# Patient Record
Sex: FEMALE | Race: OTHER | NOT HISPANIC OR LATINO | URBAN - METROPOLITAN AREA
[De-identification: names, ages, dates, MRNs, and addresses within clinical notes are randomized per-mention and may not be internally consistent; named-entity substitution may affect disease eponyms.]

---

## 2017-12-30 ENCOUNTER — INPATIENT (INPATIENT)
Facility: HOSPITAL | Age: 52
LOS: 5 days | Discharge: HOME | DRG: 101 | End: 2018-01-05
Attending: HOSPITALIST | Admitting: HOSPITALIST
Payer: COMMERCIAL

## 2017-12-30 VITALS
OXYGEN SATURATION: 98 % | SYSTOLIC BLOOD PRESSURE: 102 MMHG | HEIGHT: 62 IN | TEMPERATURE: 99 F | WEIGHT: 145.06 LBS | RESPIRATION RATE: 17 BRPM | DIASTOLIC BLOOD PRESSURE: 87 MMHG | HEART RATE: 90 BPM

## 2017-12-30 DIAGNOSIS — R56.9 UNSPECIFIED CONVULSIONS: ICD-10-CM

## 2017-12-30 DIAGNOSIS — N17.9 ACUTE KIDNEY FAILURE, UNSPECIFIED: ICD-10-CM

## 2017-12-30 DIAGNOSIS — E03.9 HYPOTHYROIDISM, UNSPECIFIED: ICD-10-CM

## 2017-12-30 DIAGNOSIS — E11.8 TYPE 2 DIABETES MELLITUS WITH UNSPECIFIED COMPLICATIONS: ICD-10-CM

## 2017-12-30 DIAGNOSIS — Z29.9 ENCOUNTER FOR PROPHYLACTIC MEASURES, UNSPECIFIED: ICD-10-CM

## 2017-12-30 LAB
ALBUMIN SERPL ELPH-MCNC: 3.4 G/DL — SIGNIFICANT CHANGE UP (ref 3.3–5)
ALP SERPL-CCNC: 28 U/L — LOW (ref 30–120)
ALT FLD-CCNC: 14 U/L DA — SIGNIFICANT CHANGE UP (ref 10–60)
ANION GAP SERPL CALC-SCNC: 15 MMOL/L — SIGNIFICANT CHANGE UP (ref 5–17)
APPEARANCE UR: CLEAR — SIGNIFICANT CHANGE UP
APTT BLD: 38.2 SEC — HIGH (ref 27.5–37.4)
AST SERPL-CCNC: 23 U/L — SIGNIFICANT CHANGE UP (ref 10–40)
BASOPHILS # BLD AUTO: 0.1 K/UL — SIGNIFICANT CHANGE UP (ref 0–0.2)
BASOPHILS NFR BLD AUTO: 2 % — SIGNIFICANT CHANGE UP (ref 0–2)
BILIRUB SERPL-MCNC: 0.5 MG/DL — SIGNIFICANT CHANGE UP (ref 0.2–1.2)
BILIRUB UR-MCNC: NEGATIVE — SIGNIFICANT CHANGE UP
BUN SERPL-MCNC: 21 MG/DL — SIGNIFICANT CHANGE UP (ref 7–23)
CALCIUM SERPL-MCNC: 8.9 MG/DL — SIGNIFICANT CHANGE UP (ref 8.4–10.5)
CHLORIDE SERPL-SCNC: 100 MMOL/L — SIGNIFICANT CHANGE UP (ref 96–108)
CK MB CFR SERPL CALC: 0.4 NG/ML — SIGNIFICANT CHANGE UP (ref 0–3.6)
CO2 SERPL-SCNC: 20 MMOL/L — LOW (ref 22–31)
COLOR SPEC: YELLOW — SIGNIFICANT CHANGE UP
CREAT SERPL-MCNC: 1.34 MG/DL — HIGH (ref 0.5–1.3)
DIFF PNL FLD: ABNORMAL
EOSINOPHIL # BLD AUTO: 0.4 K/UL — SIGNIFICANT CHANGE UP (ref 0–0.5)
EOSINOPHIL NFR BLD AUTO: 9.2 % — HIGH (ref 0–6)
GLUCOSE BLDC GLUCOMTR-MCNC: 73 MG/DL — SIGNIFICANT CHANGE UP (ref 70–99)
GLUCOSE BLDC GLUCOMTR-MCNC: 88 MG/DL — SIGNIFICANT CHANGE UP (ref 70–99)
GLUCOSE BLDC GLUCOMTR-MCNC: 88 MG/DL — SIGNIFICANT CHANGE UP (ref 70–99)
GLUCOSE SERPL-MCNC: 95 MG/DL — SIGNIFICANT CHANGE UP (ref 70–99)
GLUCOSE UR QL: NEGATIVE MG/DL — SIGNIFICANT CHANGE UP
HCT VFR BLD CALC: 43.7 % — SIGNIFICANT CHANGE UP (ref 34.5–45)
HGB BLD-MCNC: 14.9 G/DL — SIGNIFICANT CHANGE UP (ref 11.5–15.5)
INR BLD: 1.16 RATIO — SIGNIFICANT CHANGE UP (ref 0.88–1.16)
KETONES UR-MCNC: ABNORMAL
LEUKOCYTE ESTERASE UR-ACNC: ABNORMAL
LYMPHOCYTES # BLD AUTO: 2 K/UL — SIGNIFICANT CHANGE UP (ref 1–3.3)
LYMPHOCYTES # BLD AUTO: 44.5 % — HIGH (ref 13–44)
MCHC RBC-ENTMCNC: 29.3 PG — SIGNIFICANT CHANGE UP (ref 27–34)
MCHC RBC-ENTMCNC: 34 GM/DL — SIGNIFICANT CHANGE UP (ref 32–36)
MCV RBC AUTO: 86.2 FL — SIGNIFICANT CHANGE UP (ref 80–100)
MONOCYTES # BLD AUTO: 0.6 K/UL — SIGNIFICANT CHANGE UP (ref 0–0.9)
MONOCYTES NFR BLD AUTO: 14 % — SIGNIFICANT CHANGE UP (ref 2–14)
NEUTROPHILS # BLD AUTO: 1.4 K/UL — LOW (ref 1.8–7.4)
NEUTROPHILS NFR BLD AUTO: 30.3 % — LOW (ref 43–77)
NITRITE UR-MCNC: NEGATIVE — SIGNIFICANT CHANGE UP
PCP SPEC-MCNC: SIGNIFICANT CHANGE UP
PH UR: 5 — SIGNIFICANT CHANGE UP (ref 5–8)
PLATELET # BLD AUTO: 166 K/UL — SIGNIFICANT CHANGE UP (ref 150–400)
POTASSIUM SERPL-MCNC: 4.1 MMOL/L — SIGNIFICANT CHANGE UP (ref 3.5–5.3)
POTASSIUM SERPL-SCNC: 4.1 MMOL/L — SIGNIFICANT CHANGE UP (ref 3.5–5.3)
PROT SERPL-MCNC: 7.9 G/DL — SIGNIFICANT CHANGE UP (ref 6–8.3)
PROT UR-MCNC: 30 MG/DL
PROTHROM AB SERPL-ACNC: 12.7 SEC — SIGNIFICANT CHANGE UP (ref 9.8–12.7)
RBC # BLD: 5.07 M/UL — SIGNIFICANT CHANGE UP (ref 3.8–5.2)
RBC # FLD: 11.3 % — SIGNIFICANT CHANGE UP (ref 10.3–14.5)
SODIUM SERPL-SCNC: 135 MMOL/L — SIGNIFICANT CHANGE UP (ref 135–145)
SP GR SPEC: 1.02 — SIGNIFICANT CHANGE UP (ref 1.01–1.02)
TROPONIN I SERPL-MCNC: 0 NG/ML — LOW (ref 0.02–0.06)
UROBILINOGEN FLD QL: NEGATIVE MG/DL — SIGNIFICANT CHANGE UP
VALPROATE SERPL-MCNC: 61 UG/ML — SIGNIFICANT CHANGE UP (ref 50–100)
WBC # BLD: 4.5 K/UL — SIGNIFICANT CHANGE UP (ref 3.8–10.5)
WBC # FLD AUTO: 4.5 K/UL — SIGNIFICANT CHANGE UP (ref 3.8–10.5)

## 2017-12-30 PROCEDURE — 99285 EMERGENCY DEPT VISIT HI MDM: CPT

## 2017-12-30 PROCEDURE — 99222 1ST HOSP IP/OBS MODERATE 55: CPT

## 2017-12-30 PROCEDURE — 93010 ELECTROCARDIOGRAM REPORT: CPT

## 2017-12-30 PROCEDURE — 70450 CT HEAD/BRAIN W/O DYE: CPT | Mod: 26

## 2017-12-30 PROCEDURE — 71010: CPT | Mod: 26

## 2017-12-30 RX ORDER — SODIUM CHLORIDE 9 MG/ML
3 INJECTION INTRAMUSCULAR; INTRAVENOUS; SUBCUTANEOUS ONCE
Qty: 0 | Refills: 0 | Status: COMPLETED | OUTPATIENT
Start: 2017-12-30 | End: 2017-12-30

## 2017-12-30 RX ORDER — DEXTROSE 50 % IN WATER 50 %
25 SYRINGE (ML) INTRAVENOUS ONCE
Qty: 0 | Refills: 0 | Status: DISCONTINUED | OUTPATIENT
Start: 2017-12-30 | End: 2018-01-05

## 2017-12-30 RX ORDER — SODIUM CHLORIDE 9 MG/ML
1000 INJECTION, SOLUTION INTRAVENOUS
Qty: 0 | Refills: 0 | Status: DISCONTINUED | OUTPATIENT
Start: 2017-12-30 | End: 2018-01-01

## 2017-12-30 RX ORDER — GLUCAGON INJECTION, SOLUTION 0.5 MG/.1ML
1 INJECTION, SOLUTION SUBCUTANEOUS ONCE
Qty: 0 | Refills: 0 | Status: DISCONTINUED | OUTPATIENT
Start: 2017-12-30 | End: 2018-01-05

## 2017-12-30 RX ORDER — HEPARIN SODIUM 5000 [USP'U]/ML
5000 INJECTION INTRAVENOUS; SUBCUTANEOUS EVERY 8 HOURS
Qty: 0 | Refills: 0 | Status: DISCONTINUED | OUTPATIENT
Start: 2017-12-30 | End: 2018-01-05

## 2017-12-30 RX ORDER — DEXTROSE 50 % IN WATER 50 %
12.5 SYRINGE (ML) INTRAVENOUS ONCE
Qty: 0 | Refills: 0 | Status: DISCONTINUED | OUTPATIENT
Start: 2017-12-30 | End: 2018-01-05

## 2017-12-30 RX ORDER — SODIUM CHLORIDE 9 MG/ML
1000 INJECTION, SOLUTION INTRAVENOUS
Qty: 0 | Refills: 0 | Status: DISCONTINUED | OUTPATIENT
Start: 2017-12-30 | End: 2018-01-05

## 2017-12-30 RX ORDER — ENOXAPARIN SODIUM 100 MG/ML
40 INJECTION SUBCUTANEOUS EVERY 24 HOURS
Qty: 0 | Refills: 0 | Status: DISCONTINUED | OUTPATIENT
Start: 2017-12-30 | End: 2017-12-30

## 2017-12-30 RX ORDER — INSULIN LISPRO 100/ML
VIAL (ML) SUBCUTANEOUS
Qty: 0 | Refills: 0 | Status: DISCONTINUED | OUTPATIENT
Start: 2017-12-30 | End: 2018-01-05

## 2017-12-30 RX ORDER — DEXTROSE 50 % IN WATER 50 %
1 SYRINGE (ML) INTRAVENOUS ONCE
Qty: 0 | Refills: 0 | Status: DISCONTINUED | OUTPATIENT
Start: 2017-12-30 | End: 2018-01-05

## 2017-12-30 RX ORDER — INSULIN LISPRO 100/ML
VIAL (ML) SUBCUTANEOUS AT BEDTIME
Qty: 0 | Refills: 0 | Status: DISCONTINUED | OUTPATIENT
Start: 2017-12-30 | End: 2018-01-05

## 2017-12-30 RX ORDER — DIVALPROEX SODIUM 500 MG/1
0 TABLET, DELAYED RELEASE ORAL
Qty: 0 | Refills: 0 | COMMUNITY

## 2017-12-30 RX ADMIN — SODIUM CHLORIDE 100 MILLILITER(S): 9 INJECTION, SOLUTION INTRAVENOUS at 18:27

## 2017-12-30 RX ADMIN — SODIUM CHLORIDE 3 MILLILITER(S): 9 INJECTION INTRAMUSCULAR; INTRAVENOUS; SUBCUTANEOUS at 18:10

## 2017-12-30 NOTE — ED PROVIDER NOTE - OBJECTIVE STATEMENT
53 y/o F presents to the ED BIB EMS today. Pt apparently had a seizure in her hotel room. Hx of NIDDM. Finger stick 88, after glucagon. Takes Depakote. Patient states that she hit her head in her hotel room and is c/o HA but appears in post-ictal state.

## 2017-12-30 NOTE — H&P ADULT - NSHPLABSRESULTS_GEN_ALL_CORE
LABS:                        14.9   4.5   )-----------( 166      ( 30 Dec 2017 18:22 )             43.7     135    |  100    |  21     ----------------------------<  95       30 Dec 2017 18:22  4.1     |  20<L>  |  1.34<H>    Ca 8.9           30 Dec 2017 18:22    TPro  7.9    /  Alb  3.4    /  TBili  0.5    /  DBili  x      /  AST  23     /  ALT  14     /  AlkPhos  28<L>  30 Dec 2017 18:22    PT/INR - ( 30 Dec 2017 18:22 )   PT: 12.7 ;   INR: 1.16        PTT - ( 30 Dec 2017 18:22 )  PTT:38.2<H>    Urinalysis Basic - ( 30 Dec 2017 23:07 )    Color: Yellow / Appearance: Clear / S.025 / pH: x  Gluc: x / Ketone: Large  / Bili: Negative / Urobili: Negative mg/dL   Blood: x / Protein: 30 mg/dL / Nitrite: Negative   Leuk Esterase: Trace / RBC: 0-2 /HPF / WBC 0-2   Sq Epi: x / Non Sq Epi: Moderate / Bacteria: Moderate    Troponin trend:  .000   @ 18:22    EKG:  Radiology:  < from: CT Head No Cont (17 @ 20:37) >    FINDINGS:   There is no CT evidence of acute intracranial hemorrhage, extra-axial   collection, vasogenic edema, mass effect, midline shift, central   herniation, hydrocephalus or acute territorial infarct.     There is mild cerebral volume loss.  There is mild patchy white matter   hypoattenuation which is nonspecific in etiology.  There is severe   cerebellar volume loss.    The paranasal sinuses are grossly clear..    The mastoid air cells and middle ear cavities are grossly clear.    The calvarium, skull base, visualized facial bones and regional soft   tissues are grossly unremarkable.    IMPRESSION:   No CT evidence of acute intracranial hemorrhage, acute territorial   infarct or intracranial trauma.  Severe cerebellar volume loss.    Follow-up MRI can be obtained as clinically warranted.    < end of copied text > LABS:                        14.9   4.5   )-----------( 166      ( 30 Dec 2017 18:22 )             43.7     135    |  100    |  21     ----------------------------<  95       30 Dec 2017 18:22  4.1     |  20<L>  |  1.34<H>    Ca 8.9           30 Dec 2017 18:22    TPro  7.9    /  Alb  3.4    /  TBili  0.5    /  DBili  x      /  AST  23     /  ALT  14     /  AlkPhos  28<L>  30 Dec 2017 18:22    PT/INR - ( 30 Dec 2017 18:22 )   PT: 12.7 ;   INR: 1.16        PTT - ( 30 Dec 2017 18:22 )  PTT:38.2<H>    Urinalysis Basic - ( 30 Dec 2017 23:07 )    Color: Yellow / Appearance: Clear / S.025 / pH: x  Gluc: x / Ketone: Large  / Bili: Negative / Urobili: Negative mg/dL   Blood: x / Protein: 30 mg/dL / Nitrite: Negative   Leuk Esterase: Trace / RBC: 0-2 /HPF / WBC 0-2   Sq Epi: x / Non Sq Epi: Moderate / Bacteria: Moderate    Troponin trend:  .000  - @ 18:22    EKG:  NSR with 1st degree AV block, LAFB, q waves in aVR and aVL  Radiology:  < from: CT Head No Cont (17 @ 20:37) >    FINDINGS:   There is no CT evidence of acute intracranial hemorrhage, extra-axial   collection, vasogenic edema, mass effect, midline shift, central   herniation, hydrocephalus or acute territorial infarct.     There is mild cerebral volume loss.  There is mild patchy white matter   hypoattenuation which is nonspecific in etiology.  There is severe   cerebellar volume loss.    The paranasal sinuses are grossly clear..    The mastoid air cells and middle ear cavities are grossly clear.    The calvarium, skull base, visualized facial bones and regional soft   tissues are grossly unremarkable.    IMPRESSION:   No CT evidence of acute intracranial hemorrhage, acute territorial   infarct or intracranial trauma.  Severe cerebellar volume loss.    Follow-up MRI can be obtained as clinically warranted.    < end of copied text >

## 2017-12-30 NOTE — ED PROVIDER NOTE - CARE PLAN
Principal Discharge DX:	Seizure  Secondary Diagnosis:	Chronic intractable headache, unspecified headache type

## 2017-12-30 NOTE — H&P ADULT - PROBLEM SELECTOR PLAN 5
IMPROVE VTE Individual Risk Assessment          RISK                                                          Points  [  ] Previous VTE                                                 3  [  ] Thrombophilia                                              2  [  ] Lower limb paralysis                                    2        (unable to hold up >15 seconds)    [  ] Current Cancer                                             2         (within 6 months)  [  ] Immobilization > 24 hrs                              1  [  ] ICU/CCU stay > 24 hours                            1  [  ] Age > 60                                                        1    IMPROVE VTE Score 0    - will be on heparin 2/2 HIREN (no lovenox)

## 2017-12-30 NOTE — H&P ADULT - PROBLEM SELECTOR PLAN 4
- patient denies DM history despite ED reports  - will place on ISS with FS qAC and qHS  - check a1c

## 2017-12-30 NOTE — H&P ADULT - ASSESSMENT
52F with hx of hypothyroidism and seizure (rest of pmhx unknown, patient denies DM despite ER records) who presents with AMS and possible seizure episode.

## 2017-12-30 NOTE — ED PROVIDER NOTE - MEDICAL DECISION MAKING DETAILS
Evaluate the etiology of altered mental status and seizure activity corollate with history and lab imaging studies.

## 2017-12-30 NOTE — ED ADULT NURSE NOTE - PLAN OF CARE
Seizure precautions/Side rails/Fall precautions/Position of comfort/Call bell/Explanation of exam/test
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
## 2017-12-30 NOTE — H&P ADULT - NSHPOUTPATIENTPROVIDERS_GEN_ALL_CORE
neurologist at [place] (from report - patient does not know the name or location of neurologist)

## 2017-12-31 LAB
ALBUMIN SERPL ELPH-MCNC: 2.9 G/DL — LOW (ref 3.3–5)
ALP SERPL-CCNC: 27 U/L — LOW (ref 30–120)
ALT FLD-CCNC: 10 U/L DA — SIGNIFICANT CHANGE UP (ref 10–60)
ANION GAP SERPL CALC-SCNC: 8 MMOL/L — SIGNIFICANT CHANGE UP (ref 5–17)
AST SERPL-CCNC: 13 U/L — SIGNIFICANT CHANGE UP (ref 10–40)
BASOPHILS # BLD AUTO: 0.1 K/UL — SIGNIFICANT CHANGE UP (ref 0–0.2)
BILIRUB SERPL-MCNC: 0.4 MG/DL — SIGNIFICANT CHANGE UP (ref 0.2–1.2)
BUN SERPL-MCNC: 18 MG/DL — SIGNIFICANT CHANGE UP (ref 7–23)
CALCIUM SERPL-MCNC: 8.5 MG/DL — SIGNIFICANT CHANGE UP (ref 8.4–10.5)
CHLORIDE SERPL-SCNC: 102 MMOL/L — SIGNIFICANT CHANGE UP (ref 96–108)
CO2 SERPL-SCNC: 21 MMOL/L — LOW (ref 22–31)
CREAT SERPL-MCNC: 1.16 MG/DL — SIGNIFICANT CHANGE UP (ref 0.5–1.3)
EOSINOPHIL # BLD AUTO: 0.5 K/UL — SIGNIFICANT CHANGE UP (ref 0–0.5)
EOSINOPHIL NFR BLD AUTO: 12 % — HIGH (ref 0–6)
ETHANOL SERPL-MCNC: <3 MG/DL — SIGNIFICANT CHANGE UP (ref 0–3)
GLUCOSE SERPL-MCNC: 103 MG/DL — HIGH (ref 70–99)
HCT VFR BLD CALC: 39.6 % — SIGNIFICANT CHANGE UP (ref 34.5–45)
HGB BLD-MCNC: 13.3 G/DL — SIGNIFICANT CHANGE UP (ref 11.5–15.5)
LYMPHOCYTES # BLD AUTO: 1.8 K/UL — SIGNIFICANT CHANGE UP (ref 1–3.3)
LYMPHOCYTES # BLD AUTO: 37 % — SIGNIFICANT CHANGE UP (ref 13–44)
MAGNESIUM SERPL-MCNC: 1.7 MG/DL — SIGNIFICANT CHANGE UP (ref 1.6–2.6)
MCHC RBC-ENTMCNC: 29.1 PG — SIGNIFICANT CHANGE UP (ref 27–34)
MCHC RBC-ENTMCNC: 33.5 GM/DL — SIGNIFICANT CHANGE UP (ref 32–36)
MCV RBC AUTO: 86.9 FL — SIGNIFICANT CHANGE UP (ref 80–100)
MONOCYTES # BLD AUTO: 0.9 K/UL — SIGNIFICANT CHANGE UP (ref 0–0.9)
MONOCYTES NFR BLD AUTO: 13 % — SIGNIFICANT CHANGE UP (ref 2–14)
NEUTROPHILS # BLD AUTO: 1.8 K/UL — SIGNIFICANT CHANGE UP (ref 1.8–7.4)
NEUTROPHILS NFR BLD AUTO: 38 % — LOW (ref 43–77)
PHOSPHATE SERPL-MCNC: 3.8 MG/DL — SIGNIFICANT CHANGE UP (ref 2.5–4.5)
PLATELET # BLD AUTO: 146 K/UL — LOW (ref 150–400)
POTASSIUM SERPL-MCNC: 3.8 MMOL/L — SIGNIFICANT CHANGE UP (ref 3.5–5.3)
POTASSIUM SERPL-SCNC: 3.8 MMOL/L — SIGNIFICANT CHANGE UP (ref 3.5–5.3)
PROT SERPL-MCNC: 7 G/DL — SIGNIFICANT CHANGE UP (ref 6–8.3)
RBC # BLD: 4.55 M/UL — SIGNIFICANT CHANGE UP (ref 3.8–5.2)
RBC # FLD: 11.6 % — SIGNIFICANT CHANGE UP (ref 10.3–14.5)
SODIUM SERPL-SCNC: 131 MMOL/L — LOW (ref 135–145)
TSH SERPL-MCNC: 3.04 UIU/ML — SIGNIFICANT CHANGE UP (ref 0.27–4.2)
WBC # BLD: 5.1 K/UL — SIGNIFICANT CHANGE UP (ref 3.8–10.5)
WBC # FLD AUTO: 5.1 K/UL — SIGNIFICANT CHANGE UP (ref 3.8–10.5)

## 2017-12-31 PROCEDURE — 99233 SBSQ HOSP IP/OBS HIGH 50: CPT

## 2017-12-31 PROCEDURE — 93010 ELECTROCARDIOGRAM REPORT: CPT

## 2017-12-31 RX ORDER — DIVALPROEX SODIUM 500 MG/1
500 TABLET, DELAYED RELEASE ORAL
Qty: 0 | Refills: 0 | Status: DISCONTINUED | OUTPATIENT
Start: 2017-12-31 | End: 2018-01-05

## 2017-12-31 RX ADMIN — HEPARIN SODIUM 5000 UNIT(S): 5000 INJECTION INTRAVENOUS; SUBCUTANEOUS at 17:56

## 2017-12-31 RX ADMIN — DIVALPROEX SODIUM 500 MILLIGRAM(S): 500 TABLET, DELAYED RELEASE ORAL at 17:56

## 2017-12-31 RX ADMIN — HEPARIN SODIUM 5000 UNIT(S): 5000 INJECTION INTRAVENOUS; SUBCUTANEOUS at 23:47

## 2017-12-31 NOTE — CONSULT NOTE ADULT - ASSESSMENT
Seen for seizure. Says she has h/o seizure since age 14.  AMS. Likely has underlying poor cognition/MR?  Had hypoglycemia. BS was 49  Pt has h/o seizures and is on Depakote - dose unknown. Depakote level in ED was therapeutic at 61.  Limited but non focal exam.  CT Head - No acute pathology. Severe cerebellar volume loss.  Breakthrough seizures secondary to Hypoglycemia.  Would continue Depakote 500 mg PO BID.  D/w covering nurse.  Fall/seizure precautions at all times.  Would continue to follow.

## 2017-12-31 NOTE — PROGRESS NOTE ADULT - PROBLEM SELECTOR PLAN 1
- possibly post-ictal still   -  depakote level 61   - check utox negative.   - check TSH pending  - neuro consulted.

## 2018-01-01 LAB
ANION GAP SERPL CALC-SCNC: 8 MMOL/L — SIGNIFICANT CHANGE UP (ref 5–17)
BUN SERPL-MCNC: 10 MG/DL — SIGNIFICANT CHANGE UP (ref 7–23)
CALCIUM SERPL-MCNC: 8.1 MG/DL — LOW (ref 8.4–10.5)
CHLORIDE SERPL-SCNC: 104 MMOL/L — SIGNIFICANT CHANGE UP (ref 96–108)
CO2 SERPL-SCNC: 24 MMOL/L — SIGNIFICANT CHANGE UP (ref 22–31)
CREAT SERPL-MCNC: 1.23 MG/DL — SIGNIFICANT CHANGE UP (ref 0.5–1.3)
GLUCOSE SERPL-MCNC: 112 MG/DL — HIGH (ref 70–99)
POTASSIUM SERPL-MCNC: 4 MMOL/L — SIGNIFICANT CHANGE UP (ref 3.5–5.3)
POTASSIUM SERPL-SCNC: 4 MMOL/L — SIGNIFICANT CHANGE UP (ref 3.5–5.3)
SODIUM SERPL-SCNC: 136 MMOL/L — SIGNIFICANT CHANGE UP (ref 135–145)

## 2018-01-01 PROCEDURE — 99233 SBSQ HOSP IP/OBS HIGH 50: CPT

## 2018-01-01 RX ORDER — SODIUM CHLORIDE 9 MG/ML
1000 INJECTION INTRAMUSCULAR; INTRAVENOUS; SUBCUTANEOUS
Qty: 0 | Refills: 0 | Status: DISCONTINUED | OUTPATIENT
Start: 2018-01-01 | End: 2018-01-02

## 2018-01-01 RX ADMIN — DIVALPROEX SODIUM 500 MILLIGRAM(S): 500 TABLET, DELAYED RELEASE ORAL at 06:43

## 2018-01-01 RX ADMIN — DIVALPROEX SODIUM 500 MILLIGRAM(S): 500 TABLET, DELAYED RELEASE ORAL at 17:43

## 2018-01-01 RX ADMIN — HEPARIN SODIUM 5000 UNIT(S): 5000 INJECTION INTRAVENOUS; SUBCUTANEOUS at 08:39

## 2018-01-01 RX ADMIN — HEPARIN SODIUM 5000 UNIT(S): 5000 INJECTION INTRAVENOUS; SUBCUTANEOUS at 22:25

## 2018-01-01 NOTE — PROGRESS NOTE ADULT - PROBLEM SELECTOR PLAN 1
- possibly post-ictal still   -  depakote level 61   - check utox negative.   - check TSH normal  - neuro consulted.  may 2/2 hypoglycemia, d/c d5 ivf, monitor blood glucose.

## 2018-01-02 LAB
ANION GAP SERPL CALC-SCNC: 9 MMOL/L — SIGNIFICANT CHANGE UP (ref 5–17)
BUN SERPL-MCNC: 6 MG/DL — LOW (ref 7–23)
CALCIUM SERPL-MCNC: 8.1 MG/DL — LOW (ref 8.4–10.5)
CHLORIDE SERPL-SCNC: 108 MMOL/L — SIGNIFICANT CHANGE UP (ref 96–108)
CO2 SERPL-SCNC: 24 MMOL/L — SIGNIFICANT CHANGE UP (ref 22–31)
CREAT SERPL-MCNC: 1.11 MG/DL — SIGNIFICANT CHANGE UP (ref 0.5–1.3)
GLUCOSE SERPL-MCNC: 73 MG/DL — SIGNIFICANT CHANGE UP (ref 70–99)
POTASSIUM SERPL-MCNC: 4.1 MMOL/L — SIGNIFICANT CHANGE UP (ref 3.5–5.3)
POTASSIUM SERPL-SCNC: 4.1 MMOL/L — SIGNIFICANT CHANGE UP (ref 3.5–5.3)
SODIUM SERPL-SCNC: 141 MMOL/L — SIGNIFICANT CHANGE UP (ref 135–145)

## 2018-01-02 PROCEDURE — 99233 SBSQ HOSP IP/OBS HIGH 50: CPT

## 2018-01-02 RX ADMIN — DIVALPROEX SODIUM 500 MILLIGRAM(S): 500 TABLET, DELAYED RELEASE ORAL at 18:01

## 2018-01-02 RX ADMIN — HEPARIN SODIUM 5000 UNIT(S): 5000 INJECTION INTRAVENOUS; SUBCUTANEOUS at 17:59

## 2018-01-02 RX ADMIN — DIVALPROEX SODIUM 500 MILLIGRAM(S): 500 TABLET, DELAYED RELEASE ORAL at 07:03

## 2018-01-02 NOTE — PROGRESS NOTE ADULT - PROBLEM SELECTOR PLAN 1
- possibly post-ictal still   -  depakote level 61   - check utox negative.   - check TSH normal  - neuro consulted.  may 2/2 hypoglycemia,  still hypoglycemic on diabetic diet, so switch her to regular diet and monitor BS over night, diabetic nurse consulted, HbA1 c pending.

## 2018-01-02 NOTE — ADVANCED PRACTICE NURSE CONSULT - ASSESSMENT
CAPILLARY BLOOD GLUCOSE      POCT Blood Glucose.: 69 mg/dL (02 Jan 2018 07:37)  POCT Blood Glucose.: 88 mg/dL (01 Jan 2018 22:20)  POCT Blood Glucose.: 71 mg/dL (01 Jan 2018 19:24)  POCT Blood Glucose.: >600 mg/dL (01 Jan 2018 18:56)  POCT Blood Glucose.: 63 mg/dL (01 Jan 2018 17:25)  POCT Blood Glucose.: 83 mg/dL (01 Jan 2018 11:51)      Vital Signs Last 24 Hrs  T(C): 36.8 (02 Jan 2018 07:53), Max: 36.8 (01 Jan 2018 11:30)  T(F): 98.3 (02 Jan 2018 07:53), Max: 98.3 (02 Jan 2018 07:53)  HR: 64 (02 Jan 2018 07:53) (61 - 78)  BP: 98/63 (02 Jan 2018 07:53) (98/63 - 690/60)  BP(mean): --  RR: 15 (02 Jan 2018 07:53) (14 - 16)  SpO2: 100% (02 Jan 2018 07:53) (98% - 100%)    General: WN/WD NAD  Respiratory: CTA B/L  CV: RRR, S1S2, no murmurs, rubs or gallops  Abdominal: Soft, NT, ND +BS,   Extremities: No edema, + peripheral pulses     01-02    141  |  108  |  6<L>  ----------------------------<  73  4.1   |  24  |  1.11    Ca    8.1<L>      02 Jan 2018 07:21        dextrose 50% Injectable 12.5 Gram(s) IV Push once  dextrose 50% Injectable 25 Gram(s) IV Push once  dextrose 50% Injectable 25 Gram(s) IV Push once  dextrose Gel 1 Dose(s) Oral once PRN  glucagon  Injectable 1 milliGRAM(s) IntraMuscular once PRN  insulin lispro (HumaLOG) corrective regimen sliding scale   SubCutaneous three times a day before meals  insulin lispro (HumaLOG) corrective regimen sliding scale   SubCutaneous at bedtime  Assessment/Plan  Hypoglycemia: denies history of diabetes non receptive to intervention, appetite excellent  A1c pending  Plan  Monitor glucose trends  Assess A1c  Regular diet

## 2018-01-02 NOTE — ADVANCED PRACTICE NURSE CONSULT - RECOMMEDATIONS
follow up post discharge to evaluate history of pre diabetes/diabetes  follow up with patient after A1c results

## 2018-01-03 DIAGNOSIS — E16.2 HYPOGLYCEMIA, UNSPECIFIED: ICD-10-CM

## 2018-01-03 LAB
ANION GAP SERPL CALC-SCNC: 8 MMOL/L — SIGNIFICANT CHANGE UP (ref 5–17)
BUN SERPL-MCNC: 7 MG/DL — SIGNIFICANT CHANGE UP (ref 7–23)
CALCIUM SERPL-MCNC: 8.5 MG/DL — SIGNIFICANT CHANGE UP (ref 8.4–10.5)
CHLORIDE SERPL-SCNC: 107 MMOL/L — SIGNIFICANT CHANGE UP (ref 96–108)
CO2 SERPL-SCNC: 21 MMOL/L — LOW (ref 22–31)
CREAT SERPL-MCNC: 1.06 MG/DL — SIGNIFICANT CHANGE UP (ref 0.5–1.3)
GLUCOSE SERPL-MCNC: 74 MG/DL — SIGNIFICANT CHANGE UP (ref 70–99)
POTASSIUM SERPL-MCNC: 4.9 MMOL/L — SIGNIFICANT CHANGE UP (ref 3.5–5.3)
POTASSIUM SERPL-SCNC: 4.9 MMOL/L — SIGNIFICANT CHANGE UP (ref 3.5–5.3)
SODIUM SERPL-SCNC: 136 MMOL/L — SIGNIFICANT CHANGE UP (ref 135–145)

## 2018-01-03 PROCEDURE — 99233 SBSQ HOSP IP/OBS HIGH 50: CPT

## 2018-01-03 RX ADMIN — DIVALPROEX SODIUM 500 MILLIGRAM(S): 500 TABLET, DELAYED RELEASE ORAL at 18:21

## 2018-01-03 RX ADMIN — DIVALPROEX SODIUM 500 MILLIGRAM(S): 500 TABLET, DELAYED RELEASE ORAL at 06:01

## 2018-01-03 NOTE — PROGRESS NOTE ADULT - PROBLEM SELECTOR PLAN 1
- possibly post-ictal still   -  depakote level 61   - check utox negative.   - check TSH normal  - neuro consulted.  most likely 2/2 hypoglycemia,  still hypoglycemic on regular  diet,, diabetic nurse consulted, HbA1 c normal 5.3   started hypoglycemic work up, ordered serum insulin level, proinsulin, C-peptide. insulin Ab at am when fasting and hypoglycemic.

## 2018-01-04 LAB
B-OH-BUTYR SERPL-SCNC: 0.1 MMOL/L — SIGNIFICANT CHANGE UP
C PEPTIDE SERPL-MCNC: 0.8 NG/ML — LOW (ref 0.9–7.1)
INSULIN SERPL-MCNC: 4 UU/ML — SIGNIFICANT CHANGE UP (ref 3–17)

## 2018-01-04 PROCEDURE — 99233 SBSQ HOSP IP/OBS HIGH 50: CPT

## 2018-01-04 RX ADMIN — DIVALPROEX SODIUM 500 MILLIGRAM(S): 500 TABLET, DELAYED RELEASE ORAL at 06:39

## 2018-01-04 RX ADMIN — DIVALPROEX SODIUM 500 MILLIGRAM(S): 500 TABLET, DELAYED RELEASE ORAL at 18:07

## 2018-01-04 NOTE — PROGRESS NOTE ADULT - PROBLEM SELECTOR PLAN 3
-  TSH normal
-  TSH normal
-  TSH normal  - can try to verify dosage if and when patient is more coherent
-  TSH normal  - can try to verify dosage if and when patient is more coherent
- check TSH  - can try to verify dosage if and when patient is more coherent

## 2018-01-04 NOTE — PROGRESS NOTE ADULT - PROBLEM SELECTOR PLAN 4
DM rule out , her Hb a1 c in normal range,   hypoglycemia, as above. consulted Dr Perlman.
- patient denies DM history despite ED reports  - will place on ISS with FS qAC and qHS  - check a1c
DM rule out , her Hb a1 c in normal range,   hypoglycemia, as above. consulted Dr Perlman.
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
## 2018-01-04 NOTE — PROGRESS NOTE ADULT - NSHPATTENDINGPLANDISCUSS_GEN_ALL_CORE
patient and covering nurse.
patient.
patient. Questions answered.
Pt  and DR nowak, about her low BS.
pt and Dr Preciado  about her seizure dx.
pt and Dr Preciado about her seizure.
pt and Dr gonzalez about seizure .
pt and Dr perlman about hypoglycemia.

## 2018-01-04 NOTE — CONSULT NOTE ADULT - PROBLEM SELECTOR RECOMMENDATION 9
to check c peptide, insulin, proinsulin, serum blood glucose if bg less than 70mg/sl  ?hypoglycemic seizures; cont to monitor bg numbers while of dextrose infusion
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
## 2018-01-04 NOTE — PROGRESS NOTE ADULT - PROBLEM SELECTOR PROBLEM 4
Type 2 diabetes mellitus with complication, unspecified long term insulin use status

## 2018-01-04 NOTE — PROGRESS NOTE ADULT - PROBLEM SELECTOR PROBLEM 2
HIREN (acute kidney injury)

## 2018-01-04 NOTE — PROGRESS NOTE ADULT - PROBLEM SELECTOR PLAN 1
- possibly post-ictal still   -  depakote level 61   - check utox negative.   - check TSH normal  - neuro consulted.  most likely 2/2 hypoglycemia,  still hypoglycemic on regular  diet,, diabetic nurse consulted, HbA1 c normal 5.3   started hypoglycemic work up, ordered serum insulin level, proinsulin, C-peptide. insulin Ab at am when fasting and hypoglycemic.  pt was npo last night and this am was not hypoglycemic, stable, all labs w/o was sent. f/u with Dr perlman endo. who was consulted.

## 2018-01-05 VITALS
RESPIRATION RATE: 16 BRPM | SYSTOLIC BLOOD PRESSURE: 108 MMHG | HEART RATE: 74 BPM | OXYGEN SATURATION: 98 % | DIASTOLIC BLOOD PRESSURE: 64 MMHG | TEMPERATURE: 98 F

## 2018-01-05 PROCEDURE — 99239 HOSP IP/OBS DSCHRG MGMT >30: CPT

## 2018-01-05 RX ADMIN — DIVALPROEX SODIUM 500 MILLIGRAM(S): 500 TABLET, DELAYED RELEASE ORAL at 06:31

## 2018-01-05 NOTE — DISCHARGE NOTE ADULT - CARE PROVIDER_API CALL
neurologist,   Phone: (   )    -  Fax: (   )    -    [facility],   Phone: (   )    -  Fax: (   )    - neurologist,   Phone: (   )    -  Fax: (   )    -    [facility],   Phone: (   )    -  Fax: (   )    -    Perlman, Craig D (), [place] [address]  Phone: [phone]  Fax: [phone]

## 2018-01-05 NOTE — PROGRESS NOTE ADULT - ASSESSMENT
52F with hx of hypothyroidism and seizure (rest of pmhx unknown, patient denies DM despite ER records) who presents with AMS and possible seizure episode.
Hypoglycemia: no history of diabetes
Hypopglycemia  educated on use of home glucose monitor   educated on s/s hypoglycemia and antedote
Seen for seizure. Says she has h/o seizure since age 14.  AMS. Likely has underlying poor cognition/MR?  Had hypoglycemia. BS was 49  Pt has h/o seizures and is on Depakote - dose unknown. Depakote level in ED was therapeutic at 61.  CT Head - No acute pathology. Severe cerebellar volume loss.  Breakthrough seizures secondary to Hypoglycemia.  Continue Depakote 500 mg PO BID.  D/w covering nurse.  Fall/seizure precautions at all times.  DC planning.
Seen for seizure. Says she has h/o seizure since age 14.  AMS. Likely has underlying poor cognition/MR?  Had hypoglycemia. BS was 49  Pt has h/o seizures and is on Depakote.   Depakote level in ED was therapeutic at 61.   Continue Depakote 500 mg PO BID.  CT Head - No acute pathology. Severe cerebellar volume loss.  Breakthrough seizures secondary to Hypoglycemia.  Pt is not diabetic. HbA1c is 5.0  D/w covering nurse.  Fall/seizure precautions at all times.  DC planning.
Seen for seizure. Says she has h/o seizure since age 14.  AMS. Likely has underlying poor cognition/MR?  Had hypoglycemia. BS was 49  Pt is NOT diabetic. HbA1c - 5.0  Pt has h/o seizures and is on Depakote - dose unknown. Depakote level in ED was therapeutic at 61.  CT Head - No acute pathology. Severe cerebellar volume loss.  Breakthrough seizures secondary to Hypoglycemia.  Continue Depakote 500 mg PO BID.  D/w covering nurse.  Fall/seizure precautions at all times.  DC planning.

## 2018-01-05 NOTE — DISCHARGE NOTE ADULT - PATIENT PORTAL LINK FT
“You can access the [facility] Patient Portal, offered by [facility], by registering with the following website: [email or web address]”

## 2018-01-05 NOTE — DISCHARGE NOTE ADULT - CARE PLAN
Principal Discharge DX:	Seizure  Goal:	2/2 hypoglycemia, controlled, cont depakote and f/u with your neurologist.  Instructions for follow-up, activity and diet:	as above  Secondary Diagnosis:	Hypoglycemia  Goal:	take snack at bed time and eat enugh with good portion of food to avoid hypoglycemia. Principal Discharge DX:	Seizure  Goal:	2/2 hypoglycemia, controlled, cont depakote and f/u with your neurologist.  Instructions for follow-up, activity and diet:	as above  Secondary Diagnosis:	Hypoglycemia  Goal:	take snack at bed time and eat enough with good portion of food to avoid hypoglycemia.  Instructions for follow-up, activity and diet:	pt is not diabetic , HbA1c normal,  and not hypoglycemic any more no need for monitoring accucheck   if she is symptomatic , recommend f/u with DR Perlman

## 2018-01-05 NOTE — DISCHARGE NOTE ADULT - PLAN OF CARE
2/2 hypoglycemia, controlled, cont depakote and f/u with your neurologist. as above take snack at bed time and eat enugh with good portion of food to avoid hypoglycemia. take snack at bed time and eat enough with good portion of food to avoid hypoglycemia. pt is not diabetic , HbA1c normal,  and not hypoglycemic any more no need for monitoring accucheck   if she is symptomatic , recommend f/u with DR Perlman

## 2018-01-05 NOTE — DISCHARGE NOTE ADULT - HOSPITAL COURSE
52F with hx of hypothyroidism and seizure (rest of pmhx unknown, patient denies DM despite ER records) who presents with AMS and possible seizure episode.  Per report, patient was at a motel and she claimed she hit her head on glass (unclear of etiology) and then suffered a seizure.  Patient says she was in her usual state of health prior to event.  However, patient unable to express any other complaints.  Patient is minimally verbal.  Patient reports having family in the [facility] but none in [place].  Would not release any information regarding her family members.  Reportedly patient was found to have a FS of 49 in the field and upon admission was found to have a FS of 88.  Patient's HCT was negative for any acute findings except for severe cerebellar volume loss.  Rest of the labs were showed slight HIREN at 1.34 and a troponin of 0.           Problem/Plan - 1:  ·  Problem: Seizure.  Plan:    -  depakote level 61   - check utox negative.   - check TSH normal  - neuro consulted.  most likely 2/2 hypoglycemia,  still hypoglycemic on regular  diet,, diabetic nurse consulted, HbA1 c normal 5.3   started hypoglycemic work up, ordered serum insulin level, proinsulin, C-peptide. insulin Ab at am when fasting and hypoglycemic.  pt was npo last night and this am was not hypoglycemic, stable, all labs w/o was sent. f/u with Dr perlman endo. who was consulted. Insulin and c-peptide level back not supporting insulinoma diagnosis. f/u with Dr perlman for other work up and f/u on the rest of these  test.      Problem/Plan - 2:  ·  Problem: HIREN (acute kidney injury).  Plan: resolved.      Problem/Plan - 3:  ·  Problem: Hypothyroidism, unspecified type.  Plan: -  TSH normal.      Problem/Plan - 4:  ·  Problem: Type 2 diabetes mellitus with complication, unspecified long term insulin use status.  Plan: DM rule out , her Hb a1 c in normal range,   hypoglycemia, as above. consulted Dr Perlman. f/ u with him.       pt eats in motel and has no ride , tried to get her home care but pt refused, will discharge her tomorrow after storm so she can have something to eat in motel other wise this pt with low appetite will have another episode of hypoglycemia and would have seizure again.   1/5/18:  pt feels better, not hypoglycemic any more, blood sugar in normal range, stable for discharge. C-peptide  low ,  Beta hydroxy buteride and  insulin normal.     I saw and examined her today  I spent 40 min , no pcp to notify   PHYSICAL EXAM    Constitutional: NAD, well-groomed, well-developed  HEENT: PERRLA, EOMI, Normal Hearing, MMM  Neck: No LAD, No JVD  Back: Normal spine flexure, No CVA tenderness  Respiratory: CTAB/L   Cardiovascular: S1 and S2, RRR, no M/G/R  Gastrointestinal: BS+, soft, NT/ND  Extremities: No peripheral edema  Vascular: 2+ peripheral pulses  Neurological: A/O x 3, no focal deficits  Skin: No rashes

## 2018-01-05 NOTE — DISCHARGE NOTE ADULT - MEDICATION SUMMARY - MEDICATIONS TO TAKE
I will START or STAY ON the medications listed below when I get home from the hospital:    Depakote  -- Indication: For Seizure

## 2018-01-05 NOTE — PROGRESS NOTE ADULT - PROVIDER SPECIALTY LIST ADULT
Diabetes
Diabetes
Hospitalist
Neurology
Hospitalist

## 2018-01-05 NOTE — DISCHARGE NOTE ADULT - PROVIDER TOKENS
FREE:[LAST:[neurologist],PHONE:[(   )    -],FAX:[(   )    -]],FREE:[LAST:[pcp],PHONE:[(   )    -],FAX:[(   )    -]] FREE:[LAST:[neurologist],PHONE:[(   )    -],FAX:[(   )    -]],FREE:[LAST:[pcp],PHONE:[(   )    -],FAX:[(   )    -]],TOKEN:'[id number]'

## 2018-01-05 NOTE — PROGRESS NOTE ADULT - SUBJECTIVE AND OBJECTIVE BOX
Patient is a 52y old  Female who presents with a chief complaint of AMS, seizure (30 Dec 2017 22:55)      INTERVAL HPI/OVERNIGHT EVENTS: 52F with hx of hypothyroidism and seizure (rest of pmhx unknown, patient denies DM despite ER records) who presents with AMS and possible seizure episode. pt feels better, not hypoglycemia today. resumed diet. nursing staff notified pt is not eating much,     MEDICATIONS  (STANDING):  dextrose 5%. 1000 milliLiter(s) (50 mL/Hr) IV Continuous <Continuous>  dextrose 50% Injectable 12.5 Gram(s) IV Push once  dextrose 50% Injectable 25 Gram(s) IV Push once  dextrose 50% Injectable 25 Gram(s) IV Push once  diVALproex  milliGRAM(s) Oral two times a day  heparin  Injectable 5000 Unit(s) SubCutaneous every 8 hours  insulin lispro (HumaLOG) corrective regimen sliding scale   SubCutaneous three times a day before meals  insulin lispro (HumaLOG) corrective regimen sliding scale   SubCutaneous at bedtime    MEDICATIONS  (PRN):  dextrose Gel 1 Dose(s) Oral once PRN Blood Glucose LESS THAN 70 milliGRAM(s)/deciliter  glucagon  Injectable 1 milliGRAM(s) IntraMuscular once PRN Glucose LESS THAN 70 milligrams/deciliter      Allergies    phenobarbital (Unknown)    Intolerances        REVIEW OF SYSTEMS:  CONSTITUTIONAL: No fever, weight loss, or fatigue  EYES: No eye pain, visual disturbances, or discharge  ENMT:  No difficulty hearing, tinnitus, vertigo; No sinus or throat pain  NECK: No pain or stiffness  BREASTS: No pain, masses, or nipple discharge  RESPIRATORY: No cough, wheezing, chills or hemoptysis; No shortness of breath  CARDIOVASCULAR: No chest pain, palpitations, dizziness, or leg swelling  GASTROINTESTINAL: No abdominal or epigastric pain. No nausea, vomiting, or hematemesis; No diarrhea or constipation. No melena or hematochezia.  GENITOURINARY: No dysuria, frequency, hematuria, or incontinence  NEUROLOGICAL: No headaches, memory loss, loss of strength, numbness, or tremors  SKIN: No itching, burning, rashes, or lesions   LYMPH NODES: No enlarged glands  ENDOCRINE: No heat or cold intolerance; No hair loss; No polydipsia or polyuria  MUSCULOSKELETAL: No joint pain or swelling; No muscle, back, or extremity pain  PSYCHIATRIC: No depression, anxiety, mood swings, or difficulty sleeping  HEME/LYMPH: No easy bruising, or bleeding gums  ALLERGY AND IMMUNOLOGIC: No hives or eczema    Vital Signs Last 24 Hrs  T(C): 36.8 (04 Jan 2018 11:33), Max: 36.9 (03 Jan 2018 23:06)  T(F): 98.2 (04 Jan 2018 11:33), Max: 98.5 (03 Jan 2018 23:06)  HR: 88 (04 Jan 2018 11:33) (66 - 88)  BP: 113/88 (04 Jan 2018 11:33) (90/59 - 113/88)  BP(mean): --  RR: 18 (04 Jan 2018 11:33) (15 - 18)  SpO2: 91% (04 Jan 2018 11:33) (91% - 99%)    PHYSICAL EXAM:  GENERAL: NAD, well-groomed, well-developed  HEAD:  Atraumatic, Normocephalic  EYES: EOMI, PERRLA, conjunctiva and sclera clear  ENMT: No tonsillar erythema, exudates, or enlargement; Moist mucous membranes, Good dentition, No lesions  NECK: Supple, No JVD, Normal thyroid  NERVOUS SYSTEM:  Alert & Oriented X3, Good concentration; Motor Strength 5/5 B/L upper and lower extremities; DTRs 2+ intact and symmetric  CHEST/LUNG: Clear to auscultation bilaterally; No rales, rhonchi, wheezing, or rubs  HEART: Regular rate and rhythm; No murmurs, rubs, or gallops  ABDOMEN: Soft, Nontender, Nondistended; Bowel sounds present  EXTREMITIES:  2+ Peripheral Pulses, No clubbing, cyanosis, or edema  LYMPH: No lymphadenopathy noted  SKIN: No rashes or lesions    LABS:      Ca    8.5        03 Jan 2018 07:41          CAPILLARY BLOOD GLUCOSE      POCT Blood Glucose.: 88 mg/dL (04 Jan 2018 12:14)  POCT Blood Glucose.: 82 mg/dL (04 Jan 2018 07:34)  POCT Blood Glucose.: 90 mg/dL (04 Jan 2018 05:48)  POCT Blood Glucose.: 111 mg/dL (03 Jan 2018 21:57)  POCT Blood Glucose.: 107 mg/dL (03 Jan 2018 16:38)      RADIOLOGY & ADDITIONAL TESTS:    Imaging Personally Reviewed: CT head no acute finding [x ] YES  [ ] NO    Consultant(s) Notes Reviewed:  [x ] YES  [ ] NO    Care Discussed with Consultants/Other Providers [x ] YES  [ ] NO
Chief Complaint:     History:    MEDICATIONS  (STANDING):  dextrose 5%. 1000 milliLiter(s) (50 mL/Hr) IV Continuous <Continuous>  dextrose 50% Injectable 12.5 Gram(s) IV Push once  dextrose 50% Injectable 25 Gram(s) IV Push once  dextrose 50% Injectable 25 Gram(s) IV Push once  diVALproex  milliGRAM(s) Oral two times a day  heparin  Injectable 5000 Unit(s) SubCutaneous every 8 hours  insulin lispro (HumaLOG) corrective regimen sliding scale   SubCutaneous three times a day before meals  insulin lispro (HumaLOG) corrective regimen sliding scale   SubCutaneous at bedtime    MEDICATIONS  (PRN):  dextrose Gel 1 Dose(s) Oral once PRN Blood Glucose LESS THAN 70 milliGRAM(s)/deciliter  glucagon  Injectable 1 milliGRAM(s) IntraMuscular once PRN Glucose LESS THAN 70 milligrams/deciliter      Allergies    phenobarbital (Unknown)    Intolerances      Constitutional: No fever  HEENT: No pain  Cardiovascular: No chest pain, palpitation  Respiratory: No SOB, no cough  GI: No nausea, vomiting, abdominal pain  Endocrine: no polyuria, polydipsia    PHYSICAL EXAM:  VITALS: T(C): 36.8 (01-05-18 @ 07:15)  T(F): 98.2 (01-05-18 @ 07:15), Max: 98.7 (01-04-18 @ 23:07)  HR: 68 (01-05-18 @ 07:15) (68 - 88)  BP: 122/73 (01-05-18 @ 07:15) (100/65 - 122/73)  RR:  (16 - 18)  SpO2:  (91% - 99%)  Wt(kg): --  GENERAL: NAD, well-groomed, well-developed  HEENT:  Atraumatic, Normocephalic, moist mucous membranes  RESPIRATORY: Clear to auscultation bilaterally; No rales, rhonchi, wheezing, or rubs  CARDIOVASCULAR: Regular rate and rhythm; No murmurs; no peripheral edema  PSYCH: Alert and oriented x 3, normal affect, normal mood      POCT Blood Glucose.: 75 mg/dL (01-05-18 @ 08:01)  POCT Blood Glucose.: 93 mg/dL (01-04-18 @ 21:01)  POCT Blood Glucose.: 108 mg/dL (01-04-18 @ 17:03)  POCT Blood Glucose.: 88 mg/dL (01-04-18 @ 12:14)  POCT Blood Glucose.: 82 mg/dL (01-04-18 @ 07:34)  POCT Blood Glucose.: 90 mg/dL (01-04-18 @ 05:48)  POCT Blood Glucose.: 111 mg/dL (01-03-18 @ 21:57)  POCT Blood Glucose.: 107 mg/dL (01-03-18 @ 16:38)  POCT Blood Glucose.: 66 mg/dL (01-03-18 @ 11:52)  POCT Blood Glucose.: 65 mg/dL (01-03-18 @ 07:47)  POCT Blood Glucose.: 116 mg/dL (01-02-18 @ 22:14)  POCT Blood Glucose.: 89 mg/dL (01-02-18 @ 17:04)  POCT Blood Glucose.: 67 mg/dL (01-02-18 @ 12:11)      01-03    136  |  107  |  7   ----------------------------<  74  4.9   |  21<L>  |  1.06    EGFR if : 71  EGFR if non : 61    Ca    8.5      01-03  Cpeptide 0.8, insulin level 4 TSH 3.7      Hemoglobin A1C, Whole Blood: 5.0 % [4.0 - 5.6] (01-02-18 @ 15:12)
Chief Complaint:     History:Hypoglycemia    MEDICATIONS  (STANDING):  dextrose 5%. 1000 milliLiter(s) (50 mL/Hr) IV Continuous <Continuous>  dextrose 50% Injectable 12.5 Gram(s) IV Push once  dextrose 50% Injectable 25 Gram(s) IV Push once  dextrose 50% Injectable 25 Gram(s) IV Push once  diVALproex  milliGRAM(s) Oral two times a day  heparin  Injectable 5000 Unit(s) SubCutaneous every 8 hours  insulin lispro (HumaLOG) corrective regimen sliding scale   SubCutaneous three times a day before meals  insulin lispro (HumaLOG) corrective regimen sliding scale   SubCutaneous at bedtime    MEDICATIONS  (PRN):  dextrose Gel 1 Dose(s) Oral once PRN Blood Glucose LESS THAN 70 milliGRAM(s)/deciliter  glucagon  Injectable 1 milliGRAM(s) IntraMuscular once PRN Glucose LESS THAN 70 milligrams/deciliter      Allergies    phenobarbital (Unknown)    Intolerances      Constitutional: No fever  HEENT: No pain  Cardiovascular: No chest pain, palpitation  Respiratory: No SOB, no cough  GI: No nausea, vomiting, abdominal pain appetite excellent  Endocrine: no shakiness sweating or mental status changes    PHYSICAL EXAM:  VITALS: T(C): 36.3 (01-03-18 @ 07:37)  T(F): 97.4 (01-03-18 @ 07:37), Max: 98.3 (01-02-18 @ 15:35)  HR: 69 (01-03-18 @ 07:37) (69 - 72)  BP: 114/72 (01-03-18 @ 07:37) (113/72 - 133/81)  RR:  (16 - 18)  SpO2:  (98% - 100%)  Wt(kg): --  GENERAL: NAD, well-groomed, well-developed  HEENT:  Atraumatic, Normocephalic, moist mucous membranes  RESPIRATORY: Clear to auscultation bilaterally; No rales, rhonchi, wheezing, or rubs  CARDIOVASCULAR: Regular rate and rhythm; No murmurs; no peripheral edema  PSYCH: Alert and oriented x 3, normal affect, normal mood      POCT Blood Glucose.: 65 mg/dL (01-03-18 @ 07:47)  POCT Blood Glucose.: 116 mg/dL (01-02-18 @ 22:14)  POCT Blood Glucose.: 89 mg/dL (01-02-18 @ 17:04)  POCT Blood Glucose.: 67 mg/dL (01-02-18 @ 12:11)  POCT Blood Glucose.: 69 mg/dL (01-02-18 @ 07:37)  POCT Blood Glucose.: 88 mg/dL (01-01-18 @ 22:20)  POCT Blood Glucose.: 71 mg/dL (01-01-18 @ 19:24)  POCT Blood Glucose.: >600 mg/dL (01-01-18 @ 18:56)  POCT Blood Glucose.: 63 mg/dL (01-01-18 @ 17:25)  POCT Blood Glucose.: 83 mg/dL (01-01-18 @ 11:51)  POCT Blood Glucose.: 118 mg/dL (01-01-18 @ 07:56)  POCT Blood Glucose.: 129 mg/dL (12-31-17 @ 21:49)  POCT Blood Glucose.: 128 mg/dL (12-31-17 @ 16:32)  POCT Blood Glucose.: 112 mg/dL (12-31-17 @ 11:42)      01-03    136  |  107  |  7   ----------------------------<  74  4.9   |  21<L>  |  1.06    EGFR if : 71  EGFR if non : 61    Ca    8.5      01-03        Hemoglobin A1C, Whole Blood: 5.0 % [4.0 - 5.6] (01-02-18 @ 15:12)
Neurology Follow up note    MELISSA INGRAM 52y Female    HPI:  ***Patient with seizures and is possibly post-ictal.  She is currently confused and is not forthcoming with information.  Therefore HPI and ROS extremely limited.***    52F with hx of hypothyroidism and seizure (rest of pmhx unknown, patient denies DM despite ER records) who presents with AMS and possible seizure episode.  Per report, patient was at a motel and she claimed she hit her head on glass (unclear of etiology) and then suffered a seizure.  Patient says she was in her usual state of health prior to event.  However, patient unable to express any other complaints.  Patient is minimally verbal.  [place]  Would not release any information regarding her family members.  Reportedly patient was found to have a FS of 49 in the field and upon admission was found to have a FS of 88.  Patient's HCT was negative for any acute findings except for severe cerebellar volume loss.  Rest of the labs were showed slight HIREN at 1.34 and a troponin of 0. (30 Dec 2017 22:55)    Interval History -    Patient is seen, chart was reviewed and case was discussed with the treatment team.  Pt is not in any distress.   Lying on bed comfortably. Doing puzzles.  No further seizure reported.    Vital Signs Last 24 Hrs  T(C): 36.8 (02 Jan 2018 15:35), Max: 36.8 (02 Jan 2018 07:53)  T(F): 98.3 (02 Jan 2018 15:35), Max: 98.3 (02 Jan 2018 07:53)  HR: 72 (02 Jan 2018 15:35) (64 - 72)  BP: 133/81 (02 Jan 2018 15:35) (98/63 - 133/81)  BP(mean): --  RR: 16 (02 Jan 2018 15:35) (15 - 16)  SpO2: 100% (02 Jan 2018 15:35) (98% - 100%)    REVIEW OF SYSTEMS:    CONSTITUTIONAL: No fever. ALOPECIA.  EYES: No eye pain,   ENMT:  No sinus or throat pain  NECK: No pain or stiffness  RESPIRATORY: No cough, No hemoptysis; No shortness of breath  CARDIOVASCULAR: No acute chest pain, palpitations,  or leg swelling  GASTROINTESTINAL: No abdominal pain. No nausea, vomiting, or hematemesis;  No melena or hematochezia.  GENITOURINARY: No  hematuria, or incontinence  MUSCULOSKELETAL: No joint swelling; No extremity pain  SKIN: No itching, rashes, or lesions.   LYMPH NODES: No enlarged glands  NEUROLOGICAL: H/o seizures. Cognitive impairment. MR?  PSYCHIATRIC: ?depression, anxiety,   ENDOCRINE: No heat or cold intolerance;   HEME/LYMPH: No easy bruising, or bleeding gums  Allergy/Immunology. No medication allergy. No seasonal allergies.    PHYSICAL EXAM:  Vital Signs Last 24 Hrs  T(F): 97.7 (12-31-17 @ 07:29)  HR: 74 (12-31-17 @ 07:29)  BP: 106/73 (12-31-17 @ 07:29)  RR: 16 (12-31-17 @ 07:29)    GENERAL: NAD, well-groomed, well-developed  HEAD:  Atraumatic, Normocephalic  EYES: EOMI, PERRLA, conjunctiva and sclera clear  NECK: Supple, No JVD, thyroid non-palpable    On Neurological Examination:    Mental Status - Pt is alert, awake.     Speech -  Able to speak.    Cranial Nerves - Pupils 3 mm equal and reactive to light, extraocular eye movements intact. Pt has no facial asymmetry. Tongue - is in midline.    Motor Exam - 4 plus/5 all over, No drift. No shaking or tremors. Muscle tone - is normal all over. Moves all extremities equally. No asymmetry is seen.      Sensory Exam - Pt withdraws all extremities equally on stimulation. No asymmetry seen. No complaints of tingling, numbness.    Gait - Pt is able to stand up with holding my hands and is able to walk for few feet around the bed.     Deep tendon Reflexes - 2 plus all over.    Coordination - Fine finger movements are normal on both sides. Finger to nose is also normal on both sides.       Romberg - Negative.    Neck Supple -  Yes.     MEDICATIONS    dextrose 5%. 1000 milliLiter(s) IV Continuous <Continuous>  dextrose 50% Injectable 12.5 Gram(s) IV Push once  dextrose 50% Injectable 25 Gram(s) IV Push once  dextrose 50% Injectable 25 Gram(s) IV Push once  dextrose Gel 1 Dose(s) Oral once PRN  diVALproex  milliGRAM(s) Oral two times a day  glucagon  Injectable 1 milliGRAM(s) IntraMuscular once PRN  heparin  Injectable 5000 Unit(s) SubCutaneous every 8 hours  insulin lispro (HumaLOG) corrective regimen sliding scale   SubCutaneous three times a day before meals  insulin lispro (HumaLOG) corrective regimen sliding scale   SubCutaneous at bedtime    Allergies    phenobarbital (Unknown)    LABS:    01-02    141  |  108  |  6<L>  ----------------------------<  73  4.1   |  24  |  1.11    Ca    8.1<L>      02 Jan 2018 07:21    Hemoglobin A1C: Hemoglobin A1C, Whole Blood: 5.0 % (01-02 @ 15:12)    RADIOLOGY      < from: CT Head No Cont (12.30.17 @ 20:37) >    IMPRESSION:   No CT evidence of acute intracranial hemorrhage, acute territorial   infarct or intracranial trauma.  Severe cerebellar volume loss.    Follow-up MRI can be obtained as clinically warranted.    < end of copied text >
Neurology Follow up note    MELISSA INGRAM 52y Female    HPI: 52F with hx of hypothyroidism and seizure (rest of pmhx unknown, patient denies DM despite ER records) who presents with AMS and possible seizure episode.  Per report, patient was at a motel and she claimed she hit her head on glass (unclear of etiology) and then suffered a seizure.  Patient says she was in her usual state of health prior to event.  However, patient unable to express any other complaints.  Patient is minimally verbal.  Patient [place] but [place] in [place].  Would not release any information regarding her family members.  Reportedly patient was found to have a FS of 49 in the field and upon admission was found to have a FS of 88.  Patient's HCT was negative for any acute findings except for severe cerebellar volume loss.  Rest of the labs were showed slight HIREN at 1.34 and a troponin of 0. (30 Dec 2017 22:55)    Interval History -    Patient is seen, chart was reviewed and case was discussed with the treatment team.  Pt is not in any distress.     Vital Signs Last 24 Hrs  T(C): 36.8 (04 Jan 2018 11:33), Max: 36.9 (03 Jan 2018 23:06)  T(F): 98.2 (04 Jan 2018 11:33), Max: 98.5 (03 Jan 2018 23:06)  HR: 88 (04 Jan 2018 11:33) (66 - 88)  BP: 113/88 (04 Jan 2018 11:33) (90/59 - 113/88)  BP(mean): --  RR: 18 (04 Jan 2018 11:33) (15 - 18)  SpO2: 91% (04 Jan 2018 11:33) (91% - 99%)        REVIEW OF SYSTEMS:    Constitutional: No fever, weight loss or fatigue  Eyes: No eye pain, visual disturbances, or discharge  ENT:  No difficulty hearing, tinnitus, vertigo; No sinus or throat pain  Neck: No pain or stiffness  Respiratory: No cough, wheezing, chills or hemoptysis  Cardiovascular: No chest pain, palpitations, shortness of breath, dizziness or leg swelling  Gastrointestinal: No abdominal or epigastric pain. No nausea, vomiting or hematemesis; No diarrhea or constipation. No melena or hematochezia.  Genitourinary: No dysuria, frequency, hematuria or incontinence  Neurological: No headaches, memory loss, loss of strength, numbness or tremors  Psychiatric: No depression, anxiety, mood swings or difficulty sleeping  Musculoskeletal: No joint pain or swelling; No muscle, back or extremity pain  Skin: No itching, burning, rashes or lesions   Lymph Nodes: No enlarged glands  Endocrine: No heat or cold intolerance; No hair loss, No h/o diabetes or thyroid dysfunction  Allergy and Immunologic: No hives or eczema    On Neurological Examination:    Mental Status - Pt is alert, awake, oriented X3. Higher functions are intact. Pt. does have mild poor cognition. Follows commands well and able to answer questions appropriately.    Speech -  Normal. Slurred. Pt has no aphasia.    Cranial Nerves - Pupils 3 mm equal and reactive to light, extraocular eye movements intact. Pt has no visual field deficit.  Pt has no right left facial asymmetry. Tongue - is in midline.    Motor Exam - 4 plus/5 all over, No drift. No shaking or tremors.  Muscle tone - is normal all over. Moves all extremities equally. No asymmetry is seen.      Sensory Exam - Pin prick, temperature, joint position and vibration are intact on either side. Pt withdraws all extremities equally on stimulation. No asymmetry seen. No complaints of tingling, numbness.    Gait - Able to stand and walk unassisted. Pt is able to stand up with holding my hands and is able to walk for few feet around the bed. Not falling to either side.    Deep tendon Reflexes - 2 plus all over.    Coordination - Fine finger movements are normal on both sides. Finger to nose is also normal on both sides.       Romberg - Negative.    Neck Supple -  Yes.     MEDICATIONS    dextrose 5%. 1000 milliLiter(s) IV Continuous <Continuous>  dextrose 50% Injectable 12.5 Gram(s) IV Push once  dextrose 50% Injectable 25 Gram(s) IV Push once  dextrose 50% Injectable 25 Gram(s) IV Push once  dextrose Gel 1 Dose(s) Oral once PRN  diVALproex  milliGRAM(s) Oral two times a day  glucagon  Injectable 1 milliGRAM(s) IntraMuscular once PRN  heparin  Injectable 5000 Unit(s) SubCutaneous every 8 hours  insulin lispro (HumaLOG) corrective regimen sliding scale   SubCutaneous three times a day before meals  insulin lispro (HumaLOG) corrective regimen sliding scale   SubCutaneous at bedtime    LABS:    01-03    136  |  107  |  7   ----------------------------<  74  4.9   |  21<L>  |  1.06    Ca    8.5      03 Jan 2018 07:41    RADIOLOGY      < from: CT Head No Cont (12.30.17 @ 20:37) >    EXAM:  CT BRAIN                                  PROCEDURE DATE:  12/30/2017          INTERPRETATION:  EXAM: CT HEAD WITHOUT INTRAVENOUS CONTRAST    CLINICAL INFORMATION: Seizure.    TECHNIQUE: Noncontrast axial CT images were acquired through the head.   Two-dimensional sagittal and coronal reformats were generated.    COMPARISON STUDY: None    FINDINGS:   There is no CT evidence of acute intracranial hemorrhage, extra-axial   collection, vasogenic edema, mass effect, midline shift, central   herniation, hydrocephalus or acute territorial infarct.     There is mild cerebral volume loss.  There is mild patchy white matter   hypoattenuation which is nonspecific in etiology.  There is severe   cerebellar volume loss.    The paranasal sinuses are grossly clear..    The mastoid air cells and middle ear cavities are grossly clear.    The calvarium, skull base, visualized facial bones and regional soft   tissues are grossly unremarkable.    IMPRESSION:   No CT evidence of acute intracranial hemorrhage, acute territorial   infarct or intracranial trauma.  Severe cerebellar volume loss.    Follow-up MRI can be obtained as clinically warranted.        < end of copied text >
Neurology Follow up note    MELISSA INGRAM 52y Female    HPI:***Patient with seizures and is possibly post-ictal.  She is currently confused and is not forthcoming with information.  Therefore HPI and ROS extremely limited.***    52F with hx of hypothyroidism and seizure (rest of pmhx unknown, patient denies DM despite ER records) who presents with AMS and possible seizure episode.  Per report, patient was at a motel and she claimed she hit her head on glass (unclear of etiology) and then suffered a seizure.  Patient says she was in her usual state of health prior to event.  However, patient unable to express any other complaints.  Patient is minimally verbal.  [place]  Would not release any information regarding her family members.  Reportedly patient was found to have a FS of 49 in the field and upon admission was found to have a FS of 88.  Patient's HCT was negative for any acute findings except for severe cerebellar volume loss.  Rest of the labs were showed slight HIREN at 1.34 and a troponin of 0. (30 Dec 2017 22:55)    Interval History -    Patient is seen, chart was reviewed and case was discussed with the treatment team.  Pt is not in any distress.   Lying on bed comfortably.     Vital Signs Last 24 Hrs  T(C): 36.3 (2018 15:00), Max: 36.8 (31 Dec 2017 23:07)  T(F): 97.4 (2018 15:00), Max: 98.3 (31 Dec 2017 23:07)  HR: 61 (2018 15:00) (61 - 103)  BP: 690/60 (2018 15:00) (103/68 - 690/60)  BP(mean): --  RR: 14 (2018 15:00) (14 - 16)  SpO2: 100% (2018 15:00) (99% - 100%)    REVIEW OF SYSTEMS:    CONSTITUTIONAL: No fever. ALOPECIA.  EYES: No eye pain,   ENMT:  No sinus or throat pain  NECK: No pain or stiffness  RESPIRATORY: No cough, No hemoptysis; No shortness of breath  CARDIOVASCULAR: No acute chest pain, palpitations,  or leg swelling  GASTROINTESTINAL: No abdominal pain. No nausea, vomiting, or hematemesis;  No melena or hematochezia.  GENITOURINARY: No  hematuria, or incontinence  MUSCULOSKELETAL: No joint swelling; No extremity pain  SKIN: No itching, rashes, or lesions.   LYMPH NODES: No enlarged glands  NEUROLOGICAL: H/o seizures. Cognitive impairment. MR?  PSYCHIATRIC: ?depression, anxiety,   ENDOCRINE: No heat or cold intolerance;   HEME/LYMPH: No easy bruising, or bleeding gums  Allergy/Immunology. No medication allergy. No seasonal allergies.    PHYSICAL EXAM:  Vital Signs Last 24 Hrs  T(F): 97.7 (17 @ 07:29)  HR: 74 (17 @ 07:29)  BP: 106/73 (17 @ 07:29)  RR: 16 (17 @ 07:29)    GENERAL: NAD, well-groomed, well-developed  HEAD:  Atraumatic, Normocephalic  EYES: EOMI, PERRLA, conjunctiva and sclera clear  NECK: Supple, No JVD, thyroid non-palpable    On Neurological Examination:    Mental Status - Pt is alert, awake.     Speech -  Able to speak.    Cranial Nerves - Pupils 3 mm equal and reactive to light, extraocular eye movements intact. Pt has no facial asymmetry. Tongue - is in midline.    Motor Exam - 4 plus/5 all over, No drift. No shaking or tremors. Muscle tone - is normal all over. Moves all extremities equally. No asymmetry is seen.      Sensory Exam - Pt withdraws all extremities equally on stimulation. No asymmetry seen. No complaints of tingling, numbness.    Gait - Pt is able to stand up with holding my hands and is able to walk for few feet around the bed.     Deep tendon Reflexes - 2 plus all over.    Coordination - Fine finger movements are normal on both sides. Finger to nose is also normal on both sides.       Romberg - Negative.    Neck Supple -  Yes.     MEDICATIONS    dextrose 5%. 1000 milliLiter(s) IV Continuous <Continuous>  dextrose 50% Injectable 12.5 Gram(s) IV Push once  dextrose 50% Injectable 25 Gram(s) IV Push once  dextrose 50% Injectable 25 Gram(s) IV Push once  dextrose Gel 1 Dose(s) Oral once PRN  diVALproex  milliGRAM(s) Oral two times a day  glucagon  Injectable 1 milliGRAM(s) IntraMuscular once PRN  heparin  Injectable 5000 Unit(s) SubCutaneous every 8 hours  insulin lispro (HumaLOG) corrective regimen sliding scale   SubCutaneous three times a day before meals  insulin lispro (HumaLOG) corrective regimen sliding scale   SubCutaneous at bedtime  sodium chloride 0.9%. 1000 milliLiter(s) IV Continuous <Continuous>      Allergies    phenobarbital (Unknown)      LABS:    CBC Full  -  ( 31 Dec 2017 06:59 )  WBC Count : 5.1 K/uL  Hemoglobin : 13.3 g/dL  Hematocrit : 39.6 %  Platelet Count - Automated : 146 K/uL  Mean Cell Volume : 86.9 fl  Mean Cell Hemoglobin : 29.1 pg  Mean Cell Hemoglobin Concentration : 33.5 gm/dL  Auto Neutrophil # : 1.8 K/uL  Auto Lymphocyte # : 1.8 K/uL  Auto Monocyte # : 0.9 K/uL  Auto Eosinophil # : 0.5 K/uL  Auto Basophil # : 0.1 K/uL  Auto Neutrophil % : 38.0 %  Auto Lymphocyte % : 37.0 %  Auto Monocyte % : 13.0 %  Auto Eosinophil % : 12.0 %    Urinalysis Basic - ( 30 Dec 2017 23:07 )    Color: Yellow / Appearance: Clear / S.025 / pH: x  Gluc: x / Ketone: Large  / Bili: Negative / Urobili: Negative mg/dL   Blood: x / Protein: 30 mg/dL / Nitrite: Negative   Leuk Esterase: Trace / RBC: 0-2 /HPF / WBC 0-2   Sq Epi: x / Non Sq Epi: Moderate / Bacteria: Moderate        136  |  104  |  10  ----------------------------<  112<H>  4.0   |  24  |  1.23    Ca    8.1<L>      2018 07:03  Phos  3.8       Mg     1.7         TPro  7.0  /  Alb  2.9<L>  /  TBili  0.4  /  DBili  x   /  AST  13  /  ALT  10  /  AlkPhos  27<L>      RADIOLOGY    < from: CT Head No Cont (17 @ 20:37) >    IMPRESSION:   No CT evidence of acute intracranial hemorrhage, acute territorial   infarct or intracranial trauma.  Severe cerebellar volume loss.    Follow-up MRI can be obtained as clinically warranted.    < end of copied text >
Patient is a 52y old  Female who presents with a chief complaint of AMS, seizure (30 Dec 2017 22:55)      INTERVAL HPI/OVERNIGHT EVENTS:  52F with hx of hypothyroidism and seizure (rest of pmhx unknown, patient denies DM despite ER records) who presents with AMS and possible seizure episode. pt feels better, no more seizure,     MEDICATIONS  (STANDING):  dextrose 5%. 1000 milliLiter(s) (50 mL/Hr) IV Continuous <Continuous>  dextrose 50% Injectable 12.5 Gram(s) IV Push once  dextrose 50% Injectable 25 Gram(s) IV Push once  dextrose 50% Injectable 25 Gram(s) IV Push once  diVALproex  milliGRAM(s) Oral two times a day  heparin  Injectable 5000 Unit(s) SubCutaneous every 8 hours  insulin lispro (HumaLOG) corrective regimen sliding scale   SubCutaneous three times a day before meals  insulin lispro (HumaLOG) corrective regimen sliding scale   SubCutaneous at bedtime  sodium chloride 0.9%. 1000 milliLiter(s) (75 mL/Hr) IV Continuous <Continuous>    MEDICATIONS  (PRN):  dextrose Gel 1 Dose(s) Oral once PRN Blood Glucose LESS THAN 70 milliGRAM(s)/deciliter  glucagon  Injectable 1 milliGRAM(s) IntraMuscular once PRN Glucose LESS THAN 70 milligrams/deciliter      Allergies    phenobarbital (Unknown)    Intolerances        REVIEW OF SYSTEMS:  CONSTITUTIONAL: No fever, weight loss, or fatigue  EYES: No eye pain, visual disturbances, or discharge  ENMT:  No difficulty hearing, tinnitus, vertigo; No sinus or throat pain  NECK: No pain or stiffness  BREASTS: No pain, masses, or nipple discharge  RESPIRATORY: No cough, wheezing, chills or hemoptysis; No shortness of breath  CARDIOVASCULAR: No chest pain, palpitations, dizziness, or leg swelling  GASTROINTESTINAL: No abdominal or epigastric pain. No nausea, vomiting, or hematemesis; No diarrhea or constipation. No melena or hematochezia.  GENITOURINARY: No dysuria, frequency, hematuria, or incontinence  NEUROLOGICAL: No headaches, memory loss, loss of strength, numbness, or tremors  SKIN: No itching, burning, rashes, or lesions   LYMPH NODES: No enlarged glands  ENDOCRINE: No heat or cold intolerance; No hair loss; No polydipsia or polyuria  MUSCULOSKELETAL: No joint pain or swelling; No muscle, back, or extremity pain  PSYCHIATRIC: No depression, anxiety, mood swings, or difficulty sleeping  HEME/LYMPH: No easy bruising, or bleeding gums  ALLERGY AND IMMUNOLOGIC: No hives or eczema    Vital Signs Last 24 Hrs  T(C): 36.3 (2018 15:00), Max: 36.8 (31 Dec 2017 23:07)  T(F): 97.4 (2018 15:00), Max: 98.3 (31 Dec 2017 23:07)  HR: 61 (2018 15:00) (61 - 103)  BP: 690/60 (2018 15:00) (103/68 - 690/60)  BP(mean): --  RR: 14 (2018 15:00) (14 - 16)  SpO2: 100% (2018 15:) (99% - 100%)    PHYSICAL EXAM:  GENERAL: NAD, well-groomed, well-developed  HEAD:  Atraumatic, Normocephalic  EYES: EOMI, PERRLA, conjunctiva and sclera clear  ENMT: No tonsillar erythema, exudates, or enlargement; Moist mucous membranes, Good dentition, No lesions  NECK: Supple, No JVD, Normal thyroid  NERVOUS SYSTEM:  Alert & Oriented X3, Good concentration; Motor Strength 5/5 B/L upper and lower extremities; DTRs 2+ intact and symmetric  CHEST/LUNG: Clear to auscultation bilaterally; No rales, rhonchi, wheezing, or rubs  HEART: Regular rate and rhythm; No murmurs, rubs, or gallops  ABDOMEN: Soft, Nontender, Nondistended; Bowel sounds present  EXTREMITIES:  2+ Peripheral Pulses, No clubbing, cyanosis, or edema  LYMPH: No lymphadenopathy noted  SKIN: No rashes or lesions    LABS:    2018 07:03    136    |  104    |  10     ----------------------------<  112    4.0     |  24     |  1.23     Ca    8.1        2018 07:03      PT/INR - ( 30 Dec 2017 18:22 )   PT: 12.7 sec;   INR: 1.16 ratio         PTT - ( 30 Dec 2017 18:22 )  PTT:38.2 sec  Urinalysis Basic - ( 30 Dec 2017 23:07 )    Color: Yellow / Appearance: Clear / S.025 / pH: x  Gluc: x / Ketone: Large  / Bili: Negative / Urobili: Negative mg/dL   Blood: x / Protein: 30 mg/dL / Nitrite: Negative   Leuk Esterase: Trace / RBC: 0-2 /HPF / WBC 0-2   Sq Epi: x / Non Sq Epi: Moderate / Bacteria: Moderate      CAPILLARY BLOOD GLUCOSE      POCT Blood Glucose.: 63 mg/dL (2018 17:25)  POCT Blood Glucose.: 83 mg/dL (2018 11:51)  POCT Blood Glucose.: 118 mg/dL (2018 07:56)  POCT Blood Glucose.: 129 mg/dL (31 Dec 2017 21:49)      RADIOLOGY & ADDITIONAL TESTS:    Imaging Personally Reviewed: cxr clear [ x] YES  [ ] NO    Consultant(s) Notes Reviewed:  [x ] YES  [ ] NO    Care Discussed with Consultants/Other Providers [ x] YES  [ ] NO
Patient is a 52y old  Female who presents with a chief complaint of AMS, seizure (30 Dec 2017 22:55)      INTERVAL HPI/OVERNIGHT EVENTS: 52F with hx of hypothyroidism and seizure (rest of pmhx unknown, patient denies DM despite ER records) who presents with AMS and possible seizure episode. pt feels better, still hypoglycemic this am.     MEDICATIONS  (STANDING):  dextrose 5%. 1000 milliLiter(s) (50 mL/Hr) IV Continuous <Continuous>  dextrose 50% Injectable 12.5 Gram(s) IV Push once  dextrose 50% Injectable 25 Gram(s) IV Push once  dextrose 50% Injectable 25 Gram(s) IV Push once  diVALproex  milliGRAM(s) Oral two times a day  heparin  Injectable 5000 Unit(s) SubCutaneous every 8 hours  insulin lispro (HumaLOG) corrective regimen sliding scale   SubCutaneous three times a day before meals  insulin lispro (HumaLOG) corrective regimen sliding scale   SubCutaneous at bedtime    MEDICATIONS  (PRN):  dextrose Gel 1 Dose(s) Oral once PRN Blood Glucose LESS THAN 70 milliGRAM(s)/deciliter  glucagon  Injectable 1 milliGRAM(s) IntraMuscular once PRN Glucose LESS THAN 70 milligrams/deciliter      Allergies    phenobarbital (Unknown)    Intolerances        REVIEW OF SYSTEMS:  CONSTITUTIONAL: No fever, weight loss, or fatigue  EYES: No eye pain, visual disturbances, or discharge  ENMT:  No difficulty hearing, tinnitus, vertigo; No sinus or throat pain  NECK: No pain or stiffness  BREASTS: No pain, masses, or nipple discharge  RESPIRATORY: No cough, wheezing, chills or hemoptysis; No shortness of breath  CARDIOVASCULAR: No chest pain, palpitations, dizziness, or leg swelling  GASTROINTESTINAL: No abdominal or epigastric pain. No nausea, vomiting, or hematemesis; No diarrhea or constipation. No melena or hematochezia.  GENITOURINARY: No dysuria, frequency, hematuria, or incontinence  NEUROLOGICAL: No headaches, memory loss, loss of strength, numbness, or tremors  SKIN: No itching, burning, rashes, or lesions   LYMPH NODES: No enlarged glands  ENDOCRINE: No heat or cold intolerance; No hair loss; No polydipsia or polyuria  MUSCULOSKELETAL: No joint pain or swelling; No muscle, back, or extremity pain  PSYCHIATRIC: No depression, anxiety, mood swings, or difficulty sleeping  HEME/LYMPH: No easy bruising, or bleeding gums  ALLERGY AND IMMUNOLOGIC: No hives or eczema    Vital Signs Last 24 Hrs  T(C): 36.8 (02 Jan 2018 07:53), Max: 36.8 (02 Jan 2018 07:53)  T(F): 98.3 (02 Jan 2018 07:53), Max: 98.3 (02 Jan 2018 07:53)  HR: 64 (02 Jan 2018 07:53) (64 - 71)  BP: 98/63 (02 Jan 2018 07:53) (98/63 - 114/76)  BP(mean): --  RR: 15 (02 Jan 2018 07:53) (15 - 16)  SpO2: 100% (02 Jan 2018 07:53) (98% - 100%)    PHYSICAL EXAM:  GENERAL: NAD, well-groomed, well-developed  HEAD:  Atraumatic, Normocephalic  EYES: EOMI, PERRLA, conjunctiva and sclera clear  ENMT: No tonsillar erythema, exudates, or enlargement; Moist mucous membranes, Good dentition, No lesions  NECK: Supple, No JVD, Normal thyroid  NERVOUS SYSTEM:  Alert & Oriented X3, Good concentration; Motor Strength 5/5 B/L upper and lower extremities; DTRs 2+ intact and symmetric  CHEST/LUNG: Clear to auscultation bilaterally; No rales, rhonchi, wheezing, or rubs  HEART: Regular rate and rhythm; No murmurs, rubs, or gallops  ABDOMEN: Soft, Nontender, Nondistended; Bowel sounds present  EXTREMITIES:  2+ Peripheral Pulses, No clubbing, cyanosis, or edema  LYMPH: No lymphadenopathy noted  SKIN: No rashes or lesions    LABS:    02 Jan 2018 07:21    141    |  108    |  6      ----------------------------<  73     4.1     |  24     |  1.11     Ca    8.1        02 Jan 2018 07:21          CAPILLARY BLOOD GLUCOSE      POCT Blood Glucose.: 67 mg/dL (02 Jan 2018 12:11)  POCT Blood Glucose.: 69 mg/dL (02 Jan 2018 07:37)  POCT Blood Glucose.: 88 mg/dL (01 Jan 2018 22:20)  POCT Blood Glucose.: 71 mg/dL (01 Jan 2018 19:24)  POCT Blood Glucose.: >600 mg/dL (01 Jan 2018 18:56)  POCT Blood Glucose.: 63 mg/dL (01 Jan 2018 17:25)      RADIOLOGY & ADDITIONAL TESTS:    Imaging Personally Reviewed: CT head no CVA  [x ] YES  [ ] NO    Consultant(s) Notes Reviewed:  [x ] YES  [ ] NO    Care Discussed with Consultants/Other Providers [ x] YES  [ ] NO
Patient is a 52y old  Female who presents with a chief complaint of AMS, seizure (30 Dec 2017 22:55)      INTERVAL HPI/OVERNIGHT EVENTS: 52F with hx of hypothyroidism and seizure (rest of pmhx unknown, patient denies DM despite ER records) who presents with AMS and possible seizure episode. pt is still post ictal, mildly confuse, unknown her baseline Mental status, no pain.     MEDICATIONS  (STANDING):  dextrose 5% + sodium chloride 0.45%. 1000 milliLiter(s) (100 mL/Hr) IV Continuous <Continuous>  dextrose 5%. 1000 milliLiter(s) (50 mL/Hr) IV Continuous <Continuous>  dextrose 50% Injectable 12.5 Gram(s) IV Push once  dextrose 50% Injectable 25 Gram(s) IV Push once  dextrose 50% Injectable 25 Gram(s) IV Push once  diVALproex  milliGRAM(s) Oral two times a day  heparin  Injectable 5000 Unit(s) SubCutaneous every 8 hours  insulin lispro (HumaLOG) corrective regimen sliding scale   SubCutaneous three times a day before meals  insulin lispro (HumaLOG) corrective regimen sliding scale   SubCutaneous at bedtime    MEDICATIONS  (PRN):  dextrose Gel 1 Dose(s) Oral once PRN Blood Glucose LESS THAN 70 milliGRAM(s)/deciliter  glucagon  Injectable 1 milliGRAM(s) IntraMuscular once PRN Glucose LESS THAN 70 milligrams/deciliter      Allergies    phenobarbital (Unknown)    Intolerances        REVIEW OF SYSTEMS:  CONSTITUTIONAL: No fever, weight loss, or fatigue  EYES: No eye pain, visual disturbances, or discharge  ENMT:  No difficulty hearing, tinnitus, vertigo; No sinus or throat pain  NECK: No pain or stiffness  BREASTS: No pain, masses, or nipple discharge  RESPIRATORY: No cough, wheezing, chills or hemoptysis; No shortness of breath  CARDIOVASCULAR: No chest pain, palpitations, dizziness, or leg swelling  GASTROINTESTINAL: No abdominal or epigastric pain. No nausea, vomiting, or hematemesis; No diarrhea or constipation. No melena or hematochezia.  GENITOURINARY: No dysuria, frequency, hematuria, or incontinence  NEUROLOGICAL: No headaches, memory loss, loss of strength, numbness, or tremors  SKIN: No itching, burning, rashes, or lesions   LYMPH NODES: No enlarged glands  ENDOCRINE: No heat or cold intolerance; No hair loss; No polydipsia or polyuria  MUSCULOSKELETAL: No joint pain or swelling; No muscle, back, or extremity pain  PSYCHIATRIC: No depression, anxiety, mood swings, or difficulty sleeping  HEME/LYMPH: No easy bruising, or bleeding gums  ALLERGY AND IMMUNOLOGIC: No hives or eczema    Vital Signs Last 24 Hrs  T(C): 36.5 (31 Dec 2017 07:29), Max: 37.1 (30 Dec 2017 17:53)  T(F): 97.7 (31 Dec 2017 07:29), Max: 98.7 (30 Dec 2017 17:53)  HR: 74 (31 Dec 2017 07:29) (72 - 90)  BP: 106/73 (31 Dec 2017 07:29) (102/87 - 110/75)  BP(mean): --  RR: 16 (31 Dec 2017 07:29) (14 - 17)  SpO2: 100% (31 Dec 2017 07:29) (97% - 100%)    PHYSICAL EXAM:  GENERAL: NAD, well-groomed, well-developed  HEAD:  Atraumatic, Normocephalic  EYES: EOMI, PERRLA, conjunctiva and sclera clear  ENMT: No tonsillar erythema, exudates, or enlargement; Moist mucous membranes, Good dentition, No lesions  NECK: Supple, No JVD, Normal thyroid  NERVOUS SYSTEM:  Alert & Oriented X3, Good concentration; Motor Strength 5/5 B/L upper and lower extremities; DTRs 2+ intact and symmetric  CHEST/LUNG: Clear to auscultation bilaterally; No rales, rhonchi, wheezing, or rubs  HEART: Regular rate and rhythm; No murmurs, rubs, or gallops  ABDOMEN: Soft, Nontender, Nondistended; Bowel sounds present  EXTREMITIES:  2+ Peripheral Pulses, No clubbing, cyanosis, or edema  LYMPH: No lymphadenopathy noted  SKIN: No rashes or lesions    LABS:                        13.3   5.1   )-----------( 146      ( 31 Dec 2017 06:59 )             39.6     31 Dec 2017 06:59    131    |  102    |  18     ----------------------------<  103    3.8     |  21     |  1.16     Ca    8.5        31 Dec 2017 06:59  Phos  3.8       31 Dec 2017 06:59  Mg     1.7       31 Dec 2017 06:59    TPro  7.0    /  Alb  2.9    /  TBili  0.4    /  DBili  x      /  AST  13     /  ALT  10     /  AlkPhos  27     31 Dec 2017 06:59    PT/INR - ( 30 Dec 2017 18:22 )   PT: 12.7 sec;   INR: 1.16 ratio         PTT - ( 30 Dec 2017 18:22 )  PTT:38.2 sec  Urinalysis Basic - ( 30 Dec 2017 23:07 )    Color: Yellow / Appearance: Clear / S.025 / pH: x  Gluc: x / Ketone: Large  / Bili: Negative / Urobili: Negative mg/dL   Blood: x / Protein: 30 mg/dL / Nitrite: Negative   Leuk Esterase: Trace / RBC: 0-2 /HPF / WBC 0-2   Sq Epi: x / Non Sq Epi: Moderate / Bacteria: Moderate      CAPILLARY BLOOD GLUCOSE      POCT Blood Glucose.: 112 mg/dL (31 Dec 2017 11:42)  POCT Blood Glucose.: 109 mg/dL (31 Dec 2017 07:39)  POCT Blood Glucose.: 73 mg/dL (30 Dec 2017 21:23)  POCT Blood Glucose.: 88 mg/dL (30 Dec 2017 20:18)  POCT Blood Glucose.: 88 mg/dL (30 Dec 2017 17:57)      RADIOLOGY & ADDITIONAL TESTS:    Imaging Personally Reviewed: ct head negative for acute finding only significant cerebellar volume loss,  [x ] YES  [ ] NO    Consultant(s) Notes Reviewed:  [x ] YES  [ ] NO    Care Discussed with Consultants/Other Providers [x ] YES  [ ] NO
Patient is a 52y old  Female who presents with a chief complaint of AMS, seizure (30 Dec 2017 22:55)      INTERVAL HPI/OVERNIGHT EVENTS: 52F with hx of hypothyroidism and seizure (rest of pmhx unknown, patient denies DM despite ER records) who presents with AMS and possible seizure episode. pt was hypoglycemic again this am. asymptomatic.      MEDICATIONS  (STANDING):  dextrose 5%. 1000 milliLiter(s) (50 mL/Hr) IV Continuous <Continuous>  dextrose 50% Injectable 12.5 Gram(s) IV Push once  dextrose 50% Injectable 25 Gram(s) IV Push once  dextrose 50% Injectable 25 Gram(s) IV Push once  diVALproex  milliGRAM(s) Oral two times a day  heparin  Injectable 5000 Unit(s) SubCutaneous every 8 hours  insulin lispro (HumaLOG) corrective regimen sliding scale   SubCutaneous three times a day before meals  insulin lispro (HumaLOG) corrective regimen sliding scale   SubCutaneous at bedtime    MEDICATIONS  (PRN):  dextrose Gel 1 Dose(s) Oral once PRN Blood Glucose LESS THAN 70 milliGRAM(s)/deciliter  glucagon  Injectable 1 milliGRAM(s) IntraMuscular once PRN Glucose LESS THAN 70 milligrams/deciliter      Allergies    phenobarbital (Unknown)    Intolerances        REVIEW OF SYSTEMS:  CONSTITUTIONAL: No fever, weight loss, or fatigue  EYES: No eye pain, visual disturbances, or discharge  ENMT:  No difficulty hearing, tinnitus, vertigo; No sinus or throat pain  NECK: No pain or stiffness  BREASTS: No pain, masses, or nipple discharge  RESPIRATORY: No cough, wheezing, chills or hemoptysis; No shortness of breath  CARDIOVASCULAR: No chest pain, palpitations, dizziness, or leg swelling  GASTROINTESTINAL: No abdominal or epigastric pain. No nausea, vomiting, or hematemesis; No diarrhea or constipation. No melena or hematochezia.  GENITOURINARY: No dysuria, frequency, hematuria, or incontinence  NEUROLOGICAL: No headaches, memory loss, loss of strength, numbness, or tremors  SKIN: No itching, burning, rashes, or lesions   LYMPH NODES: No enlarged glands  ENDOCRINE: No heat or cold intolerance; No hair loss; No polydipsia or polyuria  MUSCULOSKELETAL: No joint pain or swelling; No muscle, back, or extremity pain  PSYCHIATRIC: No depression, anxiety, mood swings, or difficulty sleeping  HEME/LYMPH: No easy bruising, or bleeding gums  ALLERGY AND IMMUNOLOGIC: No hives or eczema    Vital Signs Last 24 Hrs  T(C): 36.3 (03 Jan 2018 07:37), Max: 36.8 (02 Jan 2018 15:35)  T(F): 97.4 (03 Jan 2018 07:37), Max: 98.3 (02 Jan 2018 15:35)  HR: 69 (03 Jan 2018 07:37) (69 - 72)  BP: 114/72 (03 Jan 2018 07:37) (113/72 - 133/81)  BP(mean): --  RR: 18 (03 Jan 2018 07:37) (16 - 18)  SpO2: 98% (03 Jan 2018 07:37) (98% - 100%)    PHYSICAL EXAM:  GENERAL: NAD, well-groomed, well-developed  HEAD:  Atraumatic, Normocephalic  EYES: EOMI, PERRLA, conjunctiva and sclera clear  ENMT: No tonsillar erythema, exudates, or enlargement; Moist mucous membranes, Good dentition, No lesions  NECK: Supple, No JVD, Normal thyroid  NERVOUS SYSTEM:  Alert & Oriented X3, Good concentration; Motor Strength 5/5 B/L upper and lower extremities; DTRs 2+ intact and symmetric  CHEST/LUNG: Clear to auscultation bilaterally; No rales, rhonchi, wheezing, or rubs  HEART: Regular rate and rhythm; No murmurs, rubs, or gallops  ABDOMEN: Soft, Nontender, Nondistended; Bowel sounds present  EXTREMITIES:  2+ Peripheral Pulses, No clubbing, cyanosis, or edema  LYMPH: No lymphadenopathy noted  SKIN: No rashes or lesions    LABS:    03 Jan 2018 07:41    136    |  107    |  7      ----------------------------<  74     4.9     |  21     |  1.06     Ca    8.5        03 Jan 2018 07:41          CAPILLARY BLOOD GLUCOSE      POCT Blood Glucose.: 66 mg/dL (03 Jan 2018 11:52)  POCT Blood Glucose.: 65 mg/dL (03 Jan 2018 07:47)  POCT Blood Glucose.: 116 mg/dL (02 Jan 2018 22:14)  POCT Blood Glucose.: 89 mg/dL (02 Jan 2018 17:04)      RADIOLOGY & ADDITIONAL TESTS:    Imaging Personally Reviewed: CT head no acute issue  [ x] YES  [ ] NO    Consultant(s) Notes Reviewed:  [x ] YES  [ ] NO    Care Discussed with Consultants/Other Providers [x ] YES  [ ] NO

## 2018-01-08 LAB — PROINSULIN SERPL-MCNC: 1.7 PMOL/L — SIGNIFICANT CHANGE UP (ref 0–10)

## 2018-01-09 LAB — INSULIN HUMAN IGE QN: <0.4 U/ML — SIGNIFICANT CHANGE UP

## 2018-05-23 PROCEDURE — 81001 URINALYSIS AUTO W/SCOPE: CPT

## 2018-05-23 PROCEDURE — 99285 EMERGENCY DEPT VISIT HI MDM: CPT | Mod: 25

## 2018-05-23 PROCEDURE — 84100 ASSAY OF PHOSPHORUS: CPT

## 2018-05-23 PROCEDURE — 80307 DRUG TEST PRSMV CHEM ANLYZR: CPT

## 2018-05-23 PROCEDURE — 83525 ASSAY OF INSULIN: CPT

## 2018-05-23 PROCEDURE — 80164 ASSAY DIPROPYLACETIC ACD TOT: CPT

## 2018-05-23 PROCEDURE — 82553 CREATINE MB FRACTION: CPT

## 2018-05-23 PROCEDURE — 85027 COMPLETE CBC AUTOMATED: CPT

## 2018-05-23 PROCEDURE — 85610 PROTHROMBIN TIME: CPT

## 2018-05-23 PROCEDURE — 70450 CT HEAD/BRAIN W/O DYE: CPT

## 2018-05-23 PROCEDURE — 82962 GLUCOSE BLOOD TEST: CPT

## 2018-05-23 PROCEDURE — 83036 HEMOGLOBIN GLYCOSYLATED A1C: CPT

## 2018-05-23 PROCEDURE — 84681 ASSAY OF C-PEPTIDE: CPT

## 2018-05-23 PROCEDURE — 80048 BASIC METABOLIC PNL TOTAL CA: CPT

## 2018-05-23 PROCEDURE — 84484 ASSAY OF TROPONIN QUANT: CPT

## 2018-05-23 PROCEDURE — 84206 ASSAY OF PROINSULIN: CPT

## 2018-05-23 PROCEDURE — 36415 COLL VENOUS BLD VENIPUNCTURE: CPT

## 2018-05-23 PROCEDURE — 71045 X-RAY EXAM CHEST 1 VIEW: CPT

## 2018-05-23 PROCEDURE — 83735 ASSAY OF MAGNESIUM: CPT

## 2018-05-23 PROCEDURE — 80053 COMPREHEN METABOLIC PANEL: CPT

## 2018-05-23 PROCEDURE — 82010 KETONE BODYS QUAN: CPT

## 2018-05-23 PROCEDURE — 93005 ELECTROCARDIOGRAM TRACING: CPT

## 2018-05-23 PROCEDURE — 84443 ASSAY THYROID STIM HORMONE: CPT

## 2018-05-23 PROCEDURE — 85730 THROMBOPLASTIN TIME PARTIAL: CPT

## 2018-05-23 PROCEDURE — 86337 INSULIN ANTIBODIES: CPT

## 2023-08-29 NOTE — PROGRESS NOTE ADULT - PROBLEM SELECTOR PLAN 5
[de-identified] : RUQ tender to palpate. 
IMPROVE VTE Individual Risk Assessment          RISK                                                          Points  [  ] Previous VTE                                                 3  [  ] Thrombophilia                                              2  [  ] Lower limb paralysis                                    2        (unable to hold up >15 seconds)    [  ] Current Cancer                                             2         (within 6 months)  [  ] Immobilization > 24 hrs                              1  [  ] ICU/CCU stay > 24 hours                            1  [  ] Age > 60                                                        1    IMPROVE VTE Score 0    - will be on heparin 2/2 HIREN (no lovenox)

## 2024-06-24 NOTE — PATIENT PROFILE ADULT. - MEDICATION HERBAL REMEDIES, PROFILE
CONTINUE Semglee 32 units subcutaneous 2 times per day    CHANGE Humalog 20 units subcutaneous breakfast, 22 units subcutaneous lunch; 28 units subcutaneous dinner   
no

## 2024-12-16 NOTE — ED PROVIDER NOTE - PROGRESS NOTE DETAILS
SW/CM Discharge Plan  Informed patient is ready for discharge.  Patient to be picked up by daughter. Patient/interested person has been counseled for post hospitalization care.  Patient agrees and understands goals and plan. Initial implementation of the patient’s discharge plan has been arranged, including any devices/equipment needed for discharge. Discharge plan communicated to MD, RN, and ADRIANO.    Patient’s discharge destination is Assisted living-[facility].    Selected Continued Care - Admitted Since 12/10/2024    No services have been selected for the patient.          .   pt very sleepy, able to give name, slow to respond, not completely following commands, cannot give info on meds or what may have happened today